# Patient Record
Sex: MALE | Race: WHITE | HISPANIC OR LATINO | URBAN - METROPOLITAN AREA
[De-identification: names, ages, dates, MRNs, and addresses within clinical notes are randomized per-mention and may not be internally consistent; named-entity substitution may affect disease eponyms.]

---

## 2017-08-08 ENCOUNTER — INPATIENT (INPATIENT)
Facility: HOSPITAL | Age: 42
LOS: 1 days | Discharge: HOME | DRG: 395 | End: 2017-08-10
Attending: SURGERY | Admitting: SURGERY
Payer: COMMERCIAL

## 2017-08-08 DIAGNOSIS — K35.89 OTHER ACUTE APPENDICITIS: ICD-10-CM

## 2017-08-08 DIAGNOSIS — K35.80 UNSPECIFIED ACUTE APPENDICITIS: ICD-10-CM

## 2017-08-08 DIAGNOSIS — Z87.891 PERSONAL HISTORY OF NICOTINE DEPENDENCE: ICD-10-CM

## 2017-08-08 PROCEDURE — 74177 CT ABD & PELVIS W/CONTRAST: CPT | Mod: 26

## 2017-08-08 PROCEDURE — 93010 ELECTROCARDIOGRAM REPORT: CPT

## 2017-08-08 PROCEDURE — 71010: CPT | Mod: 26

## 2017-08-08 PROCEDURE — 99223 1ST HOSP IP/OBS HIGH 75: CPT | Mod: AI

## 2017-08-09 PROCEDURE — 99233 SBSQ HOSP IP/OBS HIGH 50: CPT

## 2017-08-10 PROCEDURE — 74177 CT ABD & PELVIS W/CONTRAST: CPT

## 2017-08-10 PROCEDURE — 96375 TX/PRO/DX INJ NEW DRUG ADDON: CPT

## 2017-08-10 PROCEDURE — 80076 HEPATIC FUNCTION PANEL: CPT

## 2017-08-10 PROCEDURE — 99285 EMERGENCY DEPT VISIT HI MDM: CPT | Mod: 25

## 2017-08-10 PROCEDURE — 93005 ELECTROCARDIOGRAM TRACING: CPT

## 2017-08-10 PROCEDURE — 80048 BASIC METABOLIC PNL TOTAL CA: CPT

## 2017-08-10 PROCEDURE — 71045 X-RAY EXAM CHEST 1 VIEW: CPT

## 2017-08-10 PROCEDURE — 81003 URINALYSIS AUTO W/O SCOPE: CPT

## 2017-08-10 PROCEDURE — 80069 RENAL FUNCTION PANEL: CPT

## 2017-08-10 PROCEDURE — 86900 BLOOD TYPING SEROLOGIC ABO: CPT

## 2017-08-10 PROCEDURE — 96361 HYDRATE IV INFUSION ADD-ON: CPT

## 2017-08-10 PROCEDURE — 85027 COMPLETE CBC AUTOMATED: CPT

## 2017-08-10 PROCEDURE — 99233 SBSQ HOSP IP/OBS HIGH 50: CPT

## 2017-08-10 PROCEDURE — 96374 THER/PROPH/DIAG INJ IV PUSH: CPT | Mod: XU

## 2017-08-10 PROCEDURE — 85730 THROMBOPLASTIN TIME PARTIAL: CPT

## 2017-08-10 PROCEDURE — 85610 PROTHROMBIN TIME: CPT

## 2017-08-10 PROCEDURE — 82150 ASSAY OF AMYLASE: CPT

## 2017-08-10 PROCEDURE — 83690 ASSAY OF LIPASE: CPT

## 2017-08-16 ENCOUNTER — APPOINTMENT (OUTPATIENT)
Dept: SURGERY | Facility: CLINIC | Age: 42
End: 2017-08-16
Payer: COMMERCIAL

## 2017-08-16 VITALS — WEIGHT: 220 LBS | BODY MASS INDEX: 34.53 KG/M2 | HEIGHT: 67 IN

## 2017-08-16 DIAGNOSIS — K35.3 ACUTE APPENDICITIS WITH LOCALIZED PERITONITIS: ICD-10-CM

## 2017-08-16 PROCEDURE — 99214 OFFICE O/P EST MOD 30 MIN: CPT

## 2017-08-17 PROBLEM — K35.3 ACUTE APPENDICITIS WITH LOCALIZED PERITONITIS: Status: ACTIVE | Noted: 2017-08-17

## 2017-09-13 ENCOUNTER — APPOINTMENT (OUTPATIENT)
Dept: SURGERY | Facility: CLINIC | Age: 42
End: 2017-09-13

## 2018-01-25 ENCOUNTER — INPATIENT (INPATIENT)
Facility: HOSPITAL | Age: 43
LOS: 0 days | Discharge: HOME | DRG: 342 | End: 2018-01-26
Attending: SURGERY | Admitting: SURGERY
Payer: COMMERCIAL

## 2018-01-25 ENCOUNTER — TRANSCRIPTION ENCOUNTER (OUTPATIENT)
Age: 43
End: 2018-01-25

## 2018-01-25 ENCOUNTER — RESULT REVIEW (OUTPATIENT)
Age: 43
End: 2018-01-25

## 2018-01-25 DIAGNOSIS — F17.210 NICOTINE DEPENDENCE, CIGARETTES, UNCOMPLICATED: ICD-10-CM

## 2018-01-25 DIAGNOSIS — K42.0 UMBILICAL HERNIA WITH OBSTRUCTION, WITHOUT GANGRENE: ICD-10-CM

## 2018-01-25 DIAGNOSIS — K36 OTHER APPENDICITIS: ICD-10-CM

## 2018-01-25 DIAGNOSIS — R10.9 UNSPECIFIED ABDOMINAL PAIN: ICD-10-CM

## 2018-01-25 PROCEDURE — 93010 ELECTROCARDIOGRAM REPORT: CPT

## 2018-01-25 PROCEDURE — 88304 TISSUE EXAM BY PATHOLOGIST: CPT | Mod: 26

## 2018-01-25 PROCEDURE — 74177 CT ABD & PELVIS W/CONTRAST: CPT | Mod: 26

## 2018-01-25 PROCEDURE — 99222 1ST HOSP IP/OBS MODERATE 55: CPT | Mod: 57

## 2018-01-26 PROCEDURE — 85027 COMPLETE CBC AUTOMATED: CPT

## 2018-01-26 PROCEDURE — 99285 EMERGENCY DEPT VISIT HI MDM: CPT | Mod: 25

## 2018-01-26 PROCEDURE — 86900 BLOOD TYPING SEROLOGIC ABO: CPT

## 2018-01-26 PROCEDURE — 86850 RBC ANTIBODY SCREEN: CPT

## 2018-01-26 PROCEDURE — 88304 TISSUE EXAM BY PATHOLOGIST: CPT

## 2018-01-26 PROCEDURE — 44970 LAPAROSCOPY APPENDECTOMY: CPT

## 2018-01-26 PROCEDURE — 80048 BASIC METABOLIC PNL TOTAL CA: CPT

## 2018-01-26 PROCEDURE — 85610 PROTHROMBIN TIME: CPT

## 2018-01-26 PROCEDURE — 93005 ELECTROCARDIOGRAM TRACING: CPT

## 2018-01-26 PROCEDURE — 36415 COLL VENOUS BLD VENIPUNCTURE: CPT

## 2018-01-26 PROCEDURE — 85730 THROMBOPLASTIN TIME PARTIAL: CPT

## 2018-01-26 PROCEDURE — 74177 CT ABD & PELVIS W/CONTRAST: CPT

## 2018-01-26 PROCEDURE — 81003 URINALYSIS AUTO W/O SCOPE: CPT

## 2018-01-26 PROCEDURE — 80076 HEPATIC FUNCTION PANEL: CPT

## 2019-04-18 ENCOUNTER — EMERGENCY (EMERGENCY)
Facility: HOSPITAL | Age: 44
LOS: 1 days | Discharge: HOME | End: 2019-04-18
Attending: EMERGENCY MEDICINE | Admitting: EMERGENCY MEDICINE
Payer: COMMERCIAL

## 2019-04-18 VITALS
HEART RATE: 134 BPM | TEMPERATURE: 100 F | HEIGHT: 67 IN | OXYGEN SATURATION: 95 % | RESPIRATION RATE: 19 BRPM | SYSTOLIC BLOOD PRESSURE: 136 MMHG | DIASTOLIC BLOOD PRESSURE: 87 MMHG | WEIGHT: 220.02 LBS

## 2019-04-18 VITALS
DIASTOLIC BLOOD PRESSURE: 64 MMHG | OXYGEN SATURATION: 96 % | SYSTOLIC BLOOD PRESSURE: 106 MMHG | RESPIRATION RATE: 16 BRPM | TEMPERATURE: 99 F | HEART RATE: 96 BPM

## 2019-04-18 DIAGNOSIS — R19.7 DIARRHEA, UNSPECIFIED: ICD-10-CM

## 2019-04-18 LAB
ALBUMIN SERPL ELPH-MCNC: 4.2 G/DL — SIGNIFICANT CHANGE UP (ref 3.3–5)
ALP SERPL-CCNC: 93 U/L — SIGNIFICANT CHANGE UP (ref 40–120)
ALT FLD-CCNC: 135 U/L DA — HIGH (ref 10–45)
ANION GAP SERPL CALC-SCNC: 13 MMOL/L — SIGNIFICANT CHANGE UP (ref 5–17)
AST SERPL-CCNC: 86 U/L — HIGH (ref 10–40)
BASOPHILS # BLD AUTO: 0.1 K/UL — SIGNIFICANT CHANGE UP (ref 0–0.2)
BASOPHILS NFR BLD AUTO: 0.6 % — SIGNIFICANT CHANGE UP (ref 0–2)
BILIRUB SERPL-MCNC: 0.5 MG/DL — SIGNIFICANT CHANGE UP (ref 0.2–1.2)
BUN SERPL-MCNC: 15 MG/DL — SIGNIFICANT CHANGE UP (ref 7–23)
CALCIUM SERPL-MCNC: 9 MG/DL — SIGNIFICANT CHANGE UP (ref 8.4–10.5)
CHLORIDE SERPL-SCNC: 98 MMOL/L — SIGNIFICANT CHANGE UP (ref 96–108)
CO2 SERPL-SCNC: 23 MMOL/L — SIGNIFICANT CHANGE UP (ref 22–31)
CREAT SERPL-MCNC: 1.09 MG/DL — SIGNIFICANT CHANGE UP (ref 0.5–1.3)
EOSINOPHIL # BLD AUTO: 0 K/UL — SIGNIFICANT CHANGE UP (ref 0–0.5)
EOSINOPHIL NFR BLD AUTO: 0.2 % — SIGNIFICANT CHANGE UP (ref 0–6)
GLUCOSE SERPL-MCNC: 150 MG/DL — HIGH (ref 70–99)
HCT VFR BLD CALC: 51.2 % — HIGH (ref 39–50)
HGB BLD-MCNC: 17.5 G/DL — HIGH (ref 13–17)
LYMPHOCYTES # BLD AUTO: 1.6 K/UL — SIGNIFICANT CHANGE UP (ref 1–3.3)
LYMPHOCYTES # BLD AUTO: 13.8 % — SIGNIFICANT CHANGE UP (ref 13–44)
MCHC RBC-ENTMCNC: 32.8 PG — SIGNIFICANT CHANGE UP (ref 27–34)
MCHC RBC-ENTMCNC: 34.1 GM/DL — SIGNIFICANT CHANGE UP (ref 32–36)
MCV RBC AUTO: 96.1 FL — SIGNIFICANT CHANGE UP (ref 80–100)
MONOCYTES # BLD AUTO: 1 K/UL — HIGH (ref 0–0.9)
MONOCYTES NFR BLD AUTO: 8.5 % — SIGNIFICANT CHANGE UP (ref 1–9)
NEUTROPHILS # BLD AUTO: 8.9 K/UL — HIGH (ref 1.8–7.4)
NEUTROPHILS NFR BLD AUTO: 77 % — SIGNIFICANT CHANGE UP (ref 43–77)
PLATELET # BLD AUTO: 214 K/UL — SIGNIFICANT CHANGE UP (ref 150–400)
POTASSIUM SERPL-MCNC: 4.2 MMOL/L — SIGNIFICANT CHANGE UP (ref 3.5–5.3)
POTASSIUM SERPL-SCNC: 4.2 MMOL/L — SIGNIFICANT CHANGE UP (ref 3.5–5.3)
PROT SERPL-MCNC: 8.5 G/DL — HIGH (ref 6–8.3)
RBC # BLD: 5.33 M/UL — SIGNIFICANT CHANGE UP (ref 4.2–5.8)
RBC # FLD: 11.8 % — SIGNIFICANT CHANGE UP (ref 10.3–14.5)
SODIUM SERPL-SCNC: 134 MMOL/L — LOW (ref 135–145)
WBC # BLD: 11.6 K/UL — HIGH (ref 3.8–10.5)
WBC # FLD AUTO: 11.6 K/UL — HIGH (ref 3.8–10.5)

## 2019-04-18 PROCEDURE — 96375 TX/PRO/DX INJ NEW DRUG ADDON: CPT

## 2019-04-18 PROCEDURE — 99284 EMERGENCY DEPT VISIT MOD MDM: CPT | Mod: 25

## 2019-04-18 PROCEDURE — 96361 HYDRATE IV INFUSION ADD-ON: CPT

## 2019-04-18 PROCEDURE — 85027 COMPLETE CBC AUTOMATED: CPT

## 2019-04-18 PROCEDURE — 36415 COLL VENOUS BLD VENIPUNCTURE: CPT

## 2019-04-18 PROCEDURE — 96374 THER/PROPH/DIAG INJ IV PUSH: CPT

## 2019-04-18 PROCEDURE — 80053 COMPREHEN METABOLIC PANEL: CPT

## 2019-04-18 RX ORDER — SODIUM CHLORIDE 9 MG/ML
1000 INJECTION INTRAMUSCULAR; INTRAVENOUS; SUBCUTANEOUS ONCE
Qty: 0 | Refills: 0 | Status: COMPLETED | OUTPATIENT
Start: 2019-04-18 | End: 2019-04-18

## 2019-04-18 RX ORDER — KETOROLAC TROMETHAMINE 30 MG/ML
30 SYRINGE (ML) INJECTION ONCE
Qty: 0 | Refills: 0 | Status: DISCONTINUED | OUTPATIENT
Start: 2019-04-18 | End: 2019-04-18

## 2019-04-18 RX ORDER — ONDANSETRON 8 MG/1
4 TABLET, FILM COATED ORAL ONCE
Qty: 0 | Refills: 0 | Status: COMPLETED | OUTPATIENT
Start: 2019-04-18 | End: 2019-04-18

## 2019-04-18 RX ADMIN — SODIUM CHLORIDE 1000 MILLILITER(S): 9 INJECTION INTRAMUSCULAR; INTRAVENOUS; SUBCUTANEOUS at 01:20

## 2019-04-18 RX ADMIN — Medication 30 MILLIGRAM(S): at 00:46

## 2019-04-18 RX ADMIN — ONDANSETRON 4 MILLIGRAM(S): 8 TABLET, FILM COATED ORAL at 00:46

## 2019-04-18 RX ADMIN — Medication 30 MILLIGRAM(S): at 01:10

## 2019-04-18 RX ADMIN — SODIUM CHLORIDE 2000 MILLILITER(S): 9 INJECTION INTRAMUSCULAR; INTRAVENOUS; SUBCUTANEOUS at 00:46

## 2019-04-18 NOTE — ED ADULT NURSE NOTE - CHIEF COMPLAINT QUOTE
pt c/o diarrhea, headache and fevers since yesterday.  Pt traveled to [facility] 6 weeks ago.

## 2019-04-18 NOTE — ED ADULT NURSE NOTE - OBJECTIVE STATEMENT
Pt presents to ED w/ c/o diarrhea since yesterday 4/17/18 & headache. Pt states "I had diarrhea probably over 100x yesterday". Pt c/o feeling nauseous however denies vomiting & abdominal pain. Pt recently traveled to [facility] 6 weeks ago.

## 2019-04-18 NOTE — ED ADULT TRIAGE NOTE - CHIEF COMPLAINT QUOTE
pt c/o diarrhea, headache and fevers since yesterday.  Pt traveled to [facility] 6 weeks ago.

## 2019-04-18 NOTE — ED PROVIDER NOTE - OBJECTIVE STATEMENT
Pertinent PMH/PSH/FHx/SHx and Review of Systems contained within:  43 y/o male no sig PMhx presents to ed c/o multiple episodes of watery diarrhea since yesterday, no change in food, wife has samle symptoms

## 2019-04-18 NOTE — ED PROVIDER NOTE - NS HIV RISK FACTOR YES
Detail Level: Zone
Note Text (......Xxx Chief Complaint.): This diagnosis correlates with the
Other (Free Text): LN2 x 1 lesion, no charge
Declined

## 2020-04-28 ENCOUNTER — TRANSCRIPTION ENCOUNTER (OUTPATIENT)
Age: 45
End: 2020-04-28

## 2020-06-10 ENCOUNTER — TRANSCRIPTION ENCOUNTER (OUTPATIENT)
Age: 45
End: 2020-06-10

## 2022-05-29 ENCOUNTER — INPATIENT (INPATIENT)
Facility: HOSPITAL | Age: 47
LOS: 1 days | Discharge: HOME | DRG: 637 | End: 2022-05-31
Attending: HOSPITALIST | Admitting: HOSPITALIST
Payer: COMMERCIAL

## 2022-05-29 VITALS
HEIGHT: 67 IN | WEIGHT: 165.35 LBS | HEART RATE: 112 BPM | TEMPERATURE: 98 F | SYSTOLIC BLOOD PRESSURE: 126 MMHG | RESPIRATION RATE: 21 BRPM | DIASTOLIC BLOOD PRESSURE: 82 MMHG

## 2022-05-29 DIAGNOSIS — E11.10 TYPE 2 DIABETES MELLITUS WITH KETOACIDOSIS WITHOUT COMA: ICD-10-CM

## 2022-05-29 DIAGNOSIS — Z90.49 ACQUIRED ABSENCE OF OTHER SPECIFIED PARTS OF DIGESTIVE TRACT: Chronic | ICD-10-CM

## 2022-05-29 DIAGNOSIS — Z98.890 OTHER SPECIFIED POSTPROCEDURAL STATES: Chronic | ICD-10-CM

## 2022-05-29 PROBLEM — Z78.9 OTHER SPECIFIED HEALTH STATUS: Chronic | Status: ACTIVE | Noted: 2019-04-18

## 2022-05-29 LAB
A1C WITH ESTIMATED AVERAGE GLUCOSE RESULT: >15.5 % — HIGH (ref 4–5.6)
ALBUMIN SERPL ELPH-MCNC: 3.1 G/DL — LOW (ref 3.3–5)
ALP SERPL-CCNC: 137 U/L — HIGH (ref 40–120)
ALT FLD-CCNC: 22 U/L — SIGNIFICANT CHANGE UP (ref 10–45)
ANION GAP SERPL CALC-SCNC: 15 MMOL/L — SIGNIFICANT CHANGE UP (ref 5–17)
ANION GAP SERPL CALC-SCNC: 18 MMOL/L — HIGH (ref 5–17)
APPEARANCE UR: CLEAR — SIGNIFICANT CHANGE UP
AST SERPL-CCNC: 9 U/L — LOW (ref 10–40)
BACTERIA # UR AUTO: ABNORMAL /HPF
BASE EXCESS BLDV CALC-SCNC: -15.4 MMOL/L — LOW (ref -2–3)
BASOPHILS # BLD AUTO: 0.03 K/UL — SIGNIFICANT CHANGE UP (ref 0–0.2)
BASOPHILS NFR BLD AUTO: 0.3 % — SIGNIFICANT CHANGE UP (ref 0–2)
BILIRUB SERPL-MCNC: 0.4 MG/DL — SIGNIFICANT CHANGE UP (ref 0.2–1.2)
BILIRUB UR-MCNC: NEGATIVE — SIGNIFICANT CHANGE UP
BLOOD GAS COMMENTS, VENOUS: SIGNIFICANT CHANGE UP
BUN SERPL-MCNC: 14 MG/DL — SIGNIFICANT CHANGE UP (ref 7–23)
BUN SERPL-MCNC: 15 MG/DL — SIGNIFICANT CHANGE UP (ref 7–23)
CALCIUM SERPL-MCNC: 7.7 MG/DL — LOW (ref 8.4–10.5)
CALCIUM SERPL-MCNC: 8.5 MG/DL — SIGNIFICANT CHANGE UP (ref 8.4–10.5)
CHLORIDE SERPL-SCNC: 105 MMOL/L — SIGNIFICANT CHANGE UP (ref 96–108)
CHLORIDE SERPL-SCNC: 97 MMOL/L — SIGNIFICANT CHANGE UP (ref 96–108)
CO2 BLDV-SCNC: 13 MMOL/L — LOW (ref 22–26)
CO2 SERPL-SCNC: 18 MMOL/L — LOW (ref 22–31)
CO2 SERPL-SCNC: 19 MMOL/L — LOW (ref 22–31)
COLOR SPEC: YELLOW — SIGNIFICANT CHANGE UP
COMMENT - URINE: SIGNIFICANT CHANGE UP
CREAT SERPL-MCNC: 0.83 MG/DL — SIGNIFICANT CHANGE UP (ref 0.5–1.3)
CREAT SERPL-MCNC: 0.84 MG/DL — SIGNIFICANT CHANGE UP (ref 0.5–1.3)
DIFF PNL FLD: NEGATIVE — SIGNIFICANT CHANGE UP
EGFR: 109 ML/MIN/1.73M2 — SIGNIFICANT CHANGE UP
EGFR: 109 ML/MIN/1.73M2 — SIGNIFICANT CHANGE UP
EOSINOPHIL # BLD AUTO: 0.22 K/UL — SIGNIFICANT CHANGE UP (ref 0–0.5)
EOSINOPHIL NFR BLD AUTO: 2.4 % — SIGNIFICANT CHANGE UP (ref 0–6)
EPI CELLS # UR: SIGNIFICANT CHANGE UP
ESTIMATED AVERAGE GLUCOSE: >398 MG/DL — HIGH (ref 68–114)
GAS PNL BLDV: SIGNIFICANT CHANGE UP
GLUCOSE BLDC GLUCOMTR-MCNC: 217 MG/DL — HIGH (ref 70–99)
GLUCOSE BLDC GLUCOMTR-MCNC: 248 MG/DL — HIGH (ref 70–99)
GLUCOSE BLDC GLUCOMTR-MCNC: 260 MG/DL — HIGH (ref 70–99)
GLUCOSE BLDC GLUCOMTR-MCNC: 277 MG/DL — HIGH (ref 70–99)
GLUCOSE BLDC GLUCOMTR-MCNC: 287 MG/DL — HIGH (ref 70–99)
GLUCOSE BLDC GLUCOMTR-MCNC: 407 MG/DL — HIGH (ref 70–99)
GLUCOSE SERPL-MCNC: 271 MG/DL — HIGH (ref 70–99)
GLUCOSE SERPL-MCNC: 423 MG/DL — HIGH (ref 70–99)
GLUCOSE UR QL: 1000 MG/DL
HCO3 BLDV-SCNC: 12 MMOL/L — LOW (ref 22–29)
HCT VFR BLD CALC: 44.1 % — SIGNIFICANT CHANGE UP (ref 39–50)
HGB BLD-MCNC: 15.3 G/DL — SIGNIFICANT CHANGE UP (ref 13–17)
IMM GRANULOCYTES NFR BLD AUTO: 0.4 % — SIGNIFICANT CHANGE UP (ref 0–1.5)
KETONES UR-MCNC: ABNORMAL
LEUKOCYTE ESTERASE UR-ACNC: NEGATIVE — SIGNIFICANT CHANGE UP
LYMPHOCYTES # BLD AUTO: 2.02 K/UL — SIGNIFICANT CHANGE UP (ref 1–3.3)
LYMPHOCYTES # BLD AUTO: 21.6 % — SIGNIFICANT CHANGE UP (ref 13–44)
MAGNESIUM SERPL-MCNC: 1.9 MG/DL — SIGNIFICANT CHANGE UP (ref 1.6–2.6)
MCHC RBC-ENTMCNC: 32.7 PG — SIGNIFICANT CHANGE UP (ref 27–34)
MCHC RBC-ENTMCNC: 34.7 GM/DL — SIGNIFICANT CHANGE UP (ref 32–36)
MCV RBC AUTO: 94.2 FL — SIGNIFICANT CHANGE UP (ref 80–100)
MONOCYTES # BLD AUTO: 0.92 K/UL — HIGH (ref 0–0.9)
MONOCYTES NFR BLD AUTO: 9.8 % — SIGNIFICANT CHANGE UP (ref 2–14)
NEUTROPHILS # BLD AUTO: 6.13 K/UL — SIGNIFICANT CHANGE UP (ref 1.8–7.4)
NEUTROPHILS NFR BLD AUTO: 65.5 % — SIGNIFICANT CHANGE UP (ref 43–77)
NITRITE UR-MCNC: NEGATIVE — SIGNIFICANT CHANGE UP
NRBC # BLD: 0 /100 WBCS — SIGNIFICANT CHANGE UP (ref 0–0)
PCO2 BLDV: 28 MMHG — LOW (ref 42–55)
PH BLDV: 7.24 — LOW (ref 7.32–7.43)
PH UR: 6 — SIGNIFICANT CHANGE UP (ref 5–8)
PHOSPHATE SERPL-MCNC: 3.8 MG/DL — SIGNIFICANT CHANGE UP (ref 2.5–4.5)
PLATELET # BLD AUTO: 285 K/UL — SIGNIFICANT CHANGE UP (ref 150–400)
PO2 BLDV: 68 MMHG — HIGH (ref 25–45)
POTASSIUM SERPL-MCNC: 4.1 MMOL/L — SIGNIFICANT CHANGE UP (ref 3.5–5.3)
POTASSIUM SERPL-MCNC: 4.5 MMOL/L — SIGNIFICANT CHANGE UP (ref 3.5–5.3)
POTASSIUM SERPL-SCNC: 4.1 MMOL/L — SIGNIFICANT CHANGE UP (ref 3.5–5.3)
POTASSIUM SERPL-SCNC: 4.5 MMOL/L — SIGNIFICANT CHANGE UP (ref 3.5–5.3)
PROT SERPL-MCNC: 7.2 G/DL — SIGNIFICANT CHANGE UP (ref 6–8.3)
PROT UR-MCNC: 30 MG/DL
RBC # BLD: 4.68 M/UL — SIGNIFICANT CHANGE UP (ref 4.2–5.8)
RBC # FLD: 11.6 % — SIGNIFICANT CHANGE UP (ref 10.3–14.5)
RBC CASTS # UR COMP ASSIST: NEGATIVE /HPF — SIGNIFICANT CHANGE UP (ref 0–4)
SAO2 % BLDV: 94 % — HIGH (ref 67–88)
SARS-COV-2 RNA SPEC QL NAA+PROBE: DETECTED
SODIUM SERPL-SCNC: 133 MMOL/L — LOW (ref 135–145)
SODIUM SERPL-SCNC: 139 MMOL/L — SIGNIFICANT CHANGE UP (ref 135–145)
SP GR SPEC: 1.02 — SIGNIFICANT CHANGE UP (ref 1.01–1.02)
UROBILINOGEN FLD QL: NEGATIVE — SIGNIFICANT CHANGE UP
WBC # BLD: 9.36 K/UL — SIGNIFICANT CHANGE UP (ref 3.8–10.5)
WBC # FLD AUTO: 9.36 K/UL — SIGNIFICANT CHANGE UP (ref 3.8–10.5)
WBC UR QL: NEGATIVE /HPF — SIGNIFICANT CHANGE UP (ref 0–5)

## 2022-05-29 PROCEDURE — 99223 1ST HOSP IP/OBS HIGH 75: CPT

## 2022-05-29 PROCEDURE — 93010 ELECTROCARDIOGRAM REPORT: CPT

## 2022-05-29 PROCEDURE — 99285 EMERGENCY DEPT VISIT HI MDM: CPT

## 2022-05-29 PROCEDURE — 71045 X-RAY EXAM CHEST 1 VIEW: CPT | Mod: 26

## 2022-05-29 RX ORDER — SODIUM CHLORIDE 9 MG/ML
1000 INJECTION, SOLUTION INTRAVENOUS
Refills: 0 | Status: DISCONTINUED | OUTPATIENT
Start: 2022-05-29 | End: 2022-05-31

## 2022-05-29 RX ORDER — DEXTROSE 50 % IN WATER 50 %
25 SYRINGE (ML) INTRAVENOUS ONCE
Refills: 0 | Status: DISCONTINUED | OUTPATIENT
Start: 2022-05-29 | End: 2022-05-31

## 2022-05-29 RX ORDER — GLUCAGON INJECTION, SOLUTION 0.5 MG/.1ML
1 INJECTION, SOLUTION SUBCUTANEOUS ONCE
Refills: 0 | Status: DISCONTINUED | OUTPATIENT
Start: 2022-05-29 | End: 2022-05-31

## 2022-05-29 RX ORDER — INSULIN GLARGINE 100 [IU]/ML
18 INJECTION, SOLUTION SUBCUTANEOUS EVERY MORNING
Refills: 0 | Status: DISCONTINUED | OUTPATIENT
Start: 2022-05-29 | End: 2022-05-31

## 2022-05-29 RX ORDER — SODIUM CHLORIDE 9 MG/ML
1000 INJECTION INTRAMUSCULAR; INTRAVENOUS; SUBCUTANEOUS ONCE
Refills: 0 | Status: COMPLETED | OUTPATIENT
Start: 2022-05-29 | End: 2022-05-29

## 2022-05-29 RX ORDER — INSULIN GLARGINE 100 [IU]/ML
10 INJECTION, SOLUTION SUBCUTANEOUS ONCE
Refills: 0 | Status: COMPLETED | OUTPATIENT
Start: 2022-05-29 | End: 2022-05-29

## 2022-05-29 RX ORDER — INSULIN LISPRO 100/ML
5 VIAL (ML) SUBCUTANEOUS ONCE
Refills: 0 | Status: COMPLETED | OUTPATIENT
Start: 2022-05-29 | End: 2022-05-29

## 2022-05-29 RX ORDER — SODIUM CHLORIDE 9 MG/ML
1000 INJECTION INTRAMUSCULAR; INTRAVENOUS; SUBCUTANEOUS
Refills: 0 | Status: DISCONTINUED | OUTPATIENT
Start: 2022-05-29 | End: 2022-05-29

## 2022-05-29 RX ORDER — DEXTROSE 50 % IN WATER 50 %
15 SYRINGE (ML) INTRAVENOUS ONCE
Refills: 0 | Status: DISCONTINUED | OUTPATIENT
Start: 2022-05-29 | End: 2022-05-29

## 2022-05-29 RX ORDER — INSULIN LISPRO 100/ML
VIAL (ML) SUBCUTANEOUS
Refills: 0 | Status: DISCONTINUED | OUTPATIENT
Start: 2022-05-29 | End: 2022-05-31

## 2022-05-29 RX ORDER — ONDANSETRON 8 MG/1
4 TABLET, FILM COATED ORAL EVERY 8 HOURS
Refills: 0 | Status: DISCONTINUED | OUTPATIENT
Start: 2022-05-29 | End: 2022-05-31

## 2022-05-29 RX ORDER — ACETAMINOPHEN 500 MG
650 TABLET ORAL EVERY 6 HOURS
Refills: 0 | Status: DISCONTINUED | OUTPATIENT
Start: 2022-05-29 | End: 2022-05-31

## 2022-05-29 RX ORDER — DEXTROSE 50 % IN WATER 50 %
15 SYRINGE (ML) INTRAVENOUS ONCE
Refills: 0 | Status: DISCONTINUED | OUTPATIENT
Start: 2022-05-29 | End: 2022-05-31

## 2022-05-29 RX ORDER — INFLUENZA VIRUS VACCINE 15; 15; 15; 15 UG/.5ML; UG/.5ML; UG/.5ML; UG/.5ML
0.5 SUSPENSION INTRAMUSCULAR ONCE
Refills: 0 | Status: COMPLETED | OUTPATIENT
Start: 2022-05-29 | End: 2022-05-29

## 2022-05-29 RX ORDER — INSULIN LISPRO 100/ML
VIAL (ML) SUBCUTANEOUS AT BEDTIME
Refills: 0 | Status: DISCONTINUED | OUTPATIENT
Start: 2022-05-29 | End: 2022-05-31

## 2022-05-29 RX ORDER — DEXTROSE 50 % IN WATER 50 %
12.5 SYRINGE (ML) INTRAVENOUS ONCE
Refills: 0 | Status: DISCONTINUED | OUTPATIENT
Start: 2022-05-29 | End: 2022-05-31

## 2022-05-29 RX ORDER — INSULIN LISPRO 100/ML
6 VIAL (ML) SUBCUTANEOUS
Refills: 0 | Status: DISCONTINUED | OUTPATIENT
Start: 2022-05-29 | End: 2022-05-31

## 2022-05-29 RX ORDER — INSULIN LISPRO 100/ML
4 VIAL (ML) SUBCUTANEOUS
Refills: 0 | Status: DISCONTINUED | OUTPATIENT
Start: 2022-05-29 | End: 2022-05-29

## 2022-05-29 RX ORDER — INSULIN LISPRO 100/ML
5 VIAL (ML) SUBCUTANEOUS
Refills: 0 | Status: DISCONTINUED | OUTPATIENT
Start: 2022-05-29 | End: 2022-05-29

## 2022-05-29 RX ORDER — INSULIN GLARGINE 100 [IU]/ML
10 INJECTION, SOLUTION SUBCUTANEOUS EVERY MORNING
Refills: 0 | Status: DISCONTINUED | OUTPATIENT
Start: 2022-05-30 | End: 2022-05-29

## 2022-05-29 RX ORDER — INSULIN LISPRO 100/ML
7 VIAL (ML) SUBCUTANEOUS
Refills: 0 | Status: DISCONTINUED | OUTPATIENT
Start: 2022-05-29 | End: 2022-05-29

## 2022-05-29 RX ORDER — SODIUM CHLORIDE 9 MG/ML
1000 INJECTION INTRAMUSCULAR; INTRAVENOUS; SUBCUTANEOUS
Refills: 0 | Status: DISCONTINUED | OUTPATIENT
Start: 2022-05-29 | End: 2022-05-31

## 2022-05-29 RX ORDER — ENOXAPARIN SODIUM 100 MG/ML
40 INJECTION SUBCUTANEOUS EVERY 24 HOURS
Refills: 0 | Status: DISCONTINUED | OUTPATIENT
Start: 2022-05-29 | End: 2022-05-29

## 2022-05-29 RX ORDER — LANOLIN ALCOHOL/MO/W.PET/CERES
3 CREAM (GRAM) TOPICAL AT BEDTIME
Refills: 0 | Status: DISCONTINUED | OUTPATIENT
Start: 2022-05-29 | End: 2022-05-31

## 2022-05-29 RX ADMIN — Medication 3: at 12:31

## 2022-05-29 RX ADMIN — Medication 2: at 16:51

## 2022-05-29 RX ADMIN — SODIUM CHLORIDE 1000 MILLILITER(S): 9 INJECTION INTRAMUSCULAR; INTRAVENOUS; SUBCUTANEOUS at 08:00

## 2022-05-29 RX ADMIN — Medication 5 UNIT(S): at 12:30

## 2022-05-29 RX ADMIN — Medication 5 UNIT(S): at 16:52

## 2022-05-29 RX ADMIN — SODIUM CHLORIDE 1000 MILLILITER(S): 9 INJECTION INTRAMUSCULAR; INTRAVENOUS; SUBCUTANEOUS at 06:31

## 2022-05-29 RX ADMIN — SODIUM CHLORIDE 75 MILLILITER(S): 9 INJECTION INTRAMUSCULAR; INTRAVENOUS; SUBCUTANEOUS at 10:26

## 2022-05-29 RX ADMIN — Medication 1: at 23:18

## 2022-05-29 RX ADMIN — INSULIN GLARGINE 10 UNIT(S): 100 INJECTION, SOLUTION SUBCUTANEOUS at 10:26

## 2022-05-29 NOTE — ED PROVIDER NOTE - NS ED ROS FT
GENERAL: No fever, chills  EYES: + itchy eyes, rhinitis   ENT: no difficulty swallowing or speaking   CARDIAC: no chest pain/pressure, SOB, lower extremity swelling  PULMONARY: no cough, SOB  GI: no abdominal pain, n/v/d  : no dysuria, no hematuria  SKIN: no rashes, no ecchymosis  NEURO: no headache, lightheadedness  MSK: No joint pain, myalgia, + "weakness of b/l upper and lower extremities".

## 2022-05-29 NOTE — ED ADULT NURSE NOTE - OBJECTIVE STATEMENT
Pt presented to ed c/o weakness, as per pt from last 3 days he is having weakness of his extremities, denies any n/v, headache, blurry vision, loss of balance, double vision, numbness on extremities, any falls, head injury. on assessment pt is able to move all 4 extremities without any discomfort, sensation b/l is equal on exam, no facial droop noted, pt is able to speak in full sentence, no slurry speech noted. Pt is awake, alert and oriented x 3, able to follow commands, on room air maintaining saturation, respiration is regular and non labored, pt is tachy to 112 when received, denies any active chest pain, palpitation, pt remains comfortable in the ed, will monitor, safety maintained.

## 2022-05-29 NOTE — ED PROVIDER NOTE - OBJECTIVE STATEMENT
46 Yo M hx of DM on oral medications, pw vague complaints of weakness. Pt states he has "no power" in his b/l upper and lower extremities x 3 days, but states "Im fine". Pt noted to ambulate into ED without assistance 5/5 strength in all extremities. Of note, pt's friend recently . Pt sates he "wasn't feeling well and he told him to get checked out, but his friend didn't and ended up dying."

## 2022-05-29 NOTE — ED PROVIDER NOTE - PHYSICAL EXAMINATION
GEN: Patient awake alert NAD.   HEENT: normocephalic, atraumatic, EOMI, no scleral icterus, no scleral injection, moist MM  CARDIAC: Sinus tachycardia, S1, S2, no murmur.   PULM: CTA B/L no wheeze, rhonchi, rales.   ABD: soft NT, ND, no rebound no guarding  MSK: Moving all extremities, no edema. 5/5 strength and full ROM in all extremities.     NEURO: A&Ox3, gait normal, no focal neurological deficits, CN 2-12 grossly intact  SKIN: warm, dry, no rash.

## 2022-05-29 NOTE — H&P ADULT - ASSESSMENT
48 yo M with pmhx of DM2 and HLD presents for 3 days of generalized weakness and fatigue found with hyperglycemia, FS 400s, and positive for COVID.     # High anionic gap metabolic acidosis due to possible mild DKA  # DM2 with hyperglycemia  # Electrolyte abnormalities  - check Hba1c  - start lantus 15unit and admelog 5unit qAC, ISS and monitor FS  - monitor BMP    # COVID positive  - pt with mild symptoms. saturating well on RA  - no indications for COVID treatment at this time    # HLD   - cont lipitor     # DVT ppx: encourage ambulation. low risks 48 yo M with pmhx of DM2 and HLD presents for 3 days of generalized weakness and fatigue found with hyperglycemia, FS 400s, and positive for COVID.     # High anionic gap metabolic acidosis due to possible mild DKA  # DM2 with hyperglycemia  # Hyponatremia  - check Hba1c  - start lantus 15unit and admelog 5unit qAC, ISS and monitor FS  - monitor BMP    # COVID positive  - pt with mild symptoms. saturating well on RA  - no indications for COVID treatment at this time    # Mild transaminitis   - monitor     # HLD   - cont lipitor     # DVT ppx: encourage ambulation. low risks 46 yo M with pmhx of DM2 and HLD presents for 3 days of generalized weakness and fatigue found with hyperglycemia, FS 400s, and positive for COVID.     # High anionic gap metabolic acidosis due to likely mild DKA  # DM2 with hyperglycemia  # Hyponatremia  - check Hba1c  - start lantus 15unit and admelog 5unit qAC, ISS and monitor FS  - monitor BMP    # COVID positive  - pt with mild symptoms. saturating well on RA  - no indications for COVID treatment at this time    # Mild transaminitis   - monitor     # HLD   - cont lipitor   - check lipid panel    # DVT ppx: encourage ambulation. low risks

## 2022-05-29 NOTE — H&P ADULT - NSHPLABSRESULTS_GEN_ALL_CORE
LABS:                        15.3   9.36  )-----------( 285      ( 29 May 2022 06:30 )             44.1         139  |  105  |  14  ----------------------------<  271<H>  4.1   |  19<L>  |  0.84    Ca    7.7<L>      29 May 2022 08:12  Phos  3.8       Mg     1.9         TPro  7.2  /  Alb  3.1<L>  /  TBili  0.4  /  DBili  x   /  AST  9<L>  /  ALT  22  /  AlkPhos  137<H>        Urinalysis Basic - ( 29 May 2022 06:30 )    Color: Yellow / Appearance: Clear / S.025 / pH: x  Gluc: x / Ketone: Large  / Bili: Negative / Urobili: Negative   Blood: x / Protein: 30 mg/dL / Nitrite: Negative   Leuk Esterase: Negative / RBC: Negative /HPF / WBC Negative /HPF   Sq Epi: x / Non Sq Epi: Neg.-Few / Bacteria: Trace /HPF       CAPILLARY BLOOD GLUCOSE      POCT Blood Glucose.: 277 mg/dL (29 May 2022 07:49)  POCT Blood Glucose.: 407 mg/dL (29 May 2022 06:12)        Urinalysis Basic - ( 29 May 2022 06:30 )    Color: Yellow / Appearance: Clear / S.025 / pH: x  Gluc: x / Ketone: Large  / Bili: Negative / Urobili: Negative   Blood: x / Protein: 30 mg/dL / Nitrite: Negative   Leuk Esterase: Negative / RBC: Negative /HPF / WBC Negative /HPF   Sq Epi: x / Non Sq Epi: Neg.-Few / Bacteria: Trace /HPF        RADIOLOGY & ADDITIONAL TESTS:  CXR reviewed by me. clear lungs    Consultant(s) Notes Reviewed:  [x ] YES  [ ] NO  Care Discussed with Consultants/Other Providers [ x] YES  [ ] NO  Imaging Personally Reviewed:  [ ] YES  [ ] NO

## 2022-05-29 NOTE — ED PROVIDER NOTE - CLINICAL SUMMARY MEDICAL DECISION MAKING FREE TEXT BOX
46 Yo M hx of DM on oral medications, pw vague complaints of weakness. Pt states he has "no power" in his b/l upper and lower extremities x 3 days, but states "Im fine". Pt noted to ambulate into ED without assistance 5/5 strength in all extremities. No significant finding on exam. ddx: hyperglycemia, DKA, electrolyte disturbance, lower suspicion for infection. Labs, reassess for disposition.

## 2022-05-29 NOTE — PATIENT PROFILE ADULT - NSPRESCRALCSIXMORE_GEN_A_NUR
Information obtained from Family. Family is concerned with Father's Drinking Problem./Four or more times a week

## 2022-05-29 NOTE — PATIENT PROFILE ADULT - NSPRESCRALCFREQ_GEN_A_NUR
Never Information obtained from Family. Family is concerned with Father's Drinking Problem./2-3 times a week

## 2022-05-29 NOTE — ED PROVIDER NOTE - PROGRESS NOTE DETAILS
Carolina Omalley MD, Attending  Pt agreeable to admission. Spoke to Dr. Carolina HARRIS attending, pt does not need icu level of care at this time and recommend floor admission.

## 2022-05-29 NOTE — H&P ADULT - HISTORY OF PRESENT ILLNESS
46 yo M with pmhx of DM2 and HLD presents for 3 days of generalized weakness and fatigue. In ED, pt found with hyperglycemia,  and VBG showed pH 7.24 and bicarb 12. Pt also found positive for COVID. Pt admits to mild cough and nasal drainage for a few days, denies any fever, chills, SOB, CP, palpitations, abd pain, N/V/D. Pt also admits to recent weight loss, thirst, and frequent urination. Other ROS negative.  In ED, pt afebrile and VS stable.

## 2022-05-29 NOTE — H&P ADULT - NSICDXPASTMEDICALHX_GEN_ALL_CORE_FT
PAST MEDICAL HISTORY:  DM2 (diabetes mellitus, type 2)     HLD (hyperlipidemia)     No pertinent past medical history

## 2022-05-29 NOTE — H&P ADULT - NSHPPHYSICALEXAM_GEN_ALL_CORE
T(C): 37 (05-29-22 @ 09:28), Max: 37 (05-29-22 @ 09:28)  HR: 90 (05-29-22 @ 09:28) (90 - 112)  BP: 119/78 (05-29-22 @ 09:28) (107/70 - 126/82)  RR: 18 (05-29-22 @ 09:28) (18 - 21)  SpO2: 97% (05-29-22 @ 09:28) (97% - 97%)  Wt(kg): --Vital Signs Last 24 Hrs  T(C): 37 (29 May 2022 09:28), Max: 37 (29 May 2022 09:28)  T(F): 98.6 (29 May 2022 09:28), Max: 98.6 (29 May 2022 09:28)  HR: 90 (29 May 2022 09:28) (90 - 112)  BP: 119/78 (29 May 2022 09:28) (107/70 - 126/82)  BP(mean): 82 (29 May 2022 09:04) (82 - 82)  RR: 18 (29 May 2022 09:28) (18 - 21)  SpO2: 97% (29 May 2022 09:28) (97% - 97%)    PHYSICAL EXAM:  GENERAL: NAD, well-groomed, well-developed  HEAD:  Atraumatic, Normocephalic  EYES: EOMI, PERRLA, conjunctiva and sclera clear  ENMT: No tonsillar erythema, exudates, or enlargement; Moist mucous membranes, Good dentition, No lesions  NECK: Supple, No JVD, Normal thyroid  NERVOUS SYSTEM:  Alert & Oriented X3, Good concentration; Motor Strength 5/5 B/L upper and lower extremities  CHEST/LUNG: Clear to percussion bilaterally; No rales, rhonchi, wheezing, or rubs  HEART: Regular rate and rhythm; No murmurs, rubs, or gallops  ABDOMEN: Soft, Nontender, Nondistended; Bowel sounds present  EXTREMITIES:  No clubbing, cyanosis, or edema  SKIN: No rashes or lesions

## 2022-05-29 NOTE — ED ADULT NURSE REASSESSMENT NOTE - NS ED NURSE REASSESS COMMENT FT1
Pt FS of 277.  MD Samuel notified, hold insulin at this time. Repeat BMP sent to lab as per MD orders.

## 2022-05-29 NOTE — ED ADULT NURSE REASSESSMENT NOTE - NS ED NURSE REASSESS COMMENT FT1
Pt received from [facility] [place] at this time.  Pt is resting comfortably on stretcher.  Currently receiving IVF bolus x 2.  No acute distress noted.  Safety maintained.  Will continue to monitor.

## 2022-05-29 NOTE — PATIENT PROFILE ADULT - FALL HARM RISK - UNIVERSAL INTERVENTIONS
Bed in lowest position, wheels locked, appropriate side rails in place/Call bell, personal items and telephone in reach/Instruct patient to call for assistance before getting out of bed or chair/Non-slip footwear when patient is out of bed/[place] to call system/Physically safe environment - no spills, clutter or unnecessary equipment/Purposeful Proactive Rounding/Room/bathroom lighting operational, light cord in reach

## 2022-05-29 NOTE — PATIENT PROFILE ADULT - NSPROPTRIGHTREPNAME_GEN_A__NUR
Last INR: 12/09/20=2.2  Next INR: 12/29/20    Prescription approved per G Refill Protocol.    Shaylee Funez RN               Sandra Rodriguez (wife)

## 2022-05-30 LAB
ALBUMIN SERPL ELPH-MCNC: 2.6 G/DL — LOW (ref 3.3–5)
ALP SERPL-CCNC: 93 U/L — SIGNIFICANT CHANGE UP (ref 40–120)
ALT FLD-CCNC: 17 U/L — SIGNIFICANT CHANGE UP (ref 10–45)
ANION GAP SERPL CALC-SCNC: 12 MMOL/L — SIGNIFICANT CHANGE UP (ref 5–17)
AST SERPL-CCNC: 12 U/L — SIGNIFICANT CHANGE UP (ref 10–40)
BILIRUB DIRECT SERPL-MCNC: 0.1 MG/DL — SIGNIFICANT CHANGE UP (ref 0–0.3)
BILIRUB INDIRECT FLD-MCNC: 0.3 MG/DL — SIGNIFICANT CHANGE UP (ref 0.2–1)
BILIRUB SERPL-MCNC: 0.4 MG/DL — SIGNIFICANT CHANGE UP (ref 0.2–1.2)
BUN SERPL-MCNC: 13 MG/DL — SIGNIFICANT CHANGE UP (ref 7–23)
CALCIUM SERPL-MCNC: 8.4 MG/DL — SIGNIFICANT CHANGE UP (ref 8.4–10.5)
CHLORIDE SERPL-SCNC: 101 MMOL/L — SIGNIFICANT CHANGE UP (ref 96–108)
CHOLEST SERPL-MCNC: 228 MG/DL — HIGH
CO2 SERPL-SCNC: 27 MMOL/L — SIGNIFICANT CHANGE UP (ref 22–31)
CREAT SERPL-MCNC: 0.6 MG/DL — SIGNIFICANT CHANGE UP (ref 0.5–1.3)
EGFR: 120 ML/MIN/1.73M2 — SIGNIFICANT CHANGE UP
GLUCOSE BLDC GLUCOMTR-MCNC: 203 MG/DL — HIGH (ref 70–99)
GLUCOSE BLDC GLUCOMTR-MCNC: 205 MG/DL — HIGH (ref 70–99)
GLUCOSE BLDC GLUCOMTR-MCNC: 266 MG/DL — HIGH (ref 70–99)
GLUCOSE BLDC GLUCOMTR-MCNC: 308 MG/DL — HIGH (ref 70–99)
GLUCOSE SERPL-MCNC: 235 MG/DL — HIGH (ref 70–99)
HCT VFR BLD CALC: 41 % — SIGNIFICANT CHANGE UP (ref 39–50)
HDLC SERPL-MCNC: 25 MG/DL — LOW
HGB BLD-MCNC: 14.2 G/DL — SIGNIFICANT CHANGE UP (ref 13–17)
LIPID PNL WITH DIRECT LDL SERPL: 158 MG/DL — HIGH
MCHC RBC-ENTMCNC: 31.7 PG — SIGNIFICANT CHANGE UP (ref 27–34)
MCHC RBC-ENTMCNC: 34.6 GM/DL — SIGNIFICANT CHANGE UP (ref 32–36)
MCV RBC AUTO: 91.5 FL — SIGNIFICANT CHANGE UP (ref 80–100)
NON HDL CHOLESTEROL: 203 MG/DL — HIGH
NRBC # BLD: 0 /100 WBCS — SIGNIFICANT CHANGE UP (ref 0–0)
PLATELET # BLD AUTO: 272 K/UL — SIGNIFICANT CHANGE UP (ref 150–400)
POTASSIUM SERPL-MCNC: 3.9 MMOL/L — SIGNIFICANT CHANGE UP (ref 3.5–5.3)
POTASSIUM SERPL-SCNC: 3.9 MMOL/L — SIGNIFICANT CHANGE UP (ref 3.5–5.3)
PROT SERPL-MCNC: 6.4 G/DL — SIGNIFICANT CHANGE UP (ref 6–8.3)
RBC # BLD: 4.48 M/UL — SIGNIFICANT CHANGE UP (ref 4.2–5.8)
RBC # FLD: 11.9 % — SIGNIFICANT CHANGE UP (ref 10.3–14.5)
SODIUM SERPL-SCNC: 140 MMOL/L — SIGNIFICANT CHANGE UP (ref 135–145)
TRIGL SERPL-MCNC: 224 MG/DL — HIGH
WBC # BLD: 5.14 K/UL — SIGNIFICANT CHANGE UP (ref 3.8–10.5)
WBC # FLD AUTO: 5.14 K/UL — SIGNIFICANT CHANGE UP (ref 3.8–10.5)

## 2022-05-30 PROCEDURE — 99232 SBSQ HOSP IP/OBS MODERATE 35: CPT

## 2022-05-30 RX ADMIN — Medication 3: at 07:54

## 2022-05-30 RX ADMIN — Medication 2: at 11:34

## 2022-05-30 RX ADMIN — Medication 6 UNIT(S): at 16:56

## 2022-05-30 RX ADMIN — Medication 6 UNIT(S): at 11:33

## 2022-05-30 RX ADMIN — Medication 6 UNIT(S): at 07:54

## 2022-05-30 RX ADMIN — Medication 2: at 21:37

## 2022-05-30 RX ADMIN — INSULIN GLARGINE 18 UNIT(S): 100 INJECTION, SOLUTION SUBCUTANEOUS at 07:53

## 2022-05-30 RX ADMIN — Medication 2: at 16:54

## 2022-05-30 NOTE — DIETITIAN INITIAL EVALUATION ADULT - LITERATURE/VIDEOS GIVEN
1. Heart Healthy Consistent Carbohydrate Nutrition Therapy (In [place])  2. Carbohydrate Counting for People with Diabetes (In [place])

## 2022-05-30 NOTE — DIETITIAN INITIAL EVALUATION ADULT - PERTINENT LABORATORY DATA
05-30    140  |  101  |  13  ----------------------------<  235<H>  3.9   |  27  |  0.60    Ca    8.4      30 May 2022 05:30  Phos  3.8     05-29  Mg     1.9     05-29    TPro  6.4  /  Alb  2.6<L>  /  TBili  0.4  /  DBili  0.1  /  AST  12  /  ALT  17  /  AlkPhos  93  05-30  POCT Blood Glucose.: 203 mg/dL (05-30-22 @ 11:32)  A1C with Estimated Average Glucose Result: >15.5 % (05-29-22 @ 06:30)

## 2022-05-30 NOTE — PROGRESS NOTE ADULT - ASSESSMENT
48 yo M with pmhx of DM2 and HLD presents for 3 days of generalized weakness and fatigue found with hyperglycemia, FS 400s, and positive for COVID.     DKA -resolved  - A1C >15.5   - needs diabetes educator   - lantus increased to 18 units in AM admelog and 6unit TIDAC  - ISS and monitor FS  - monitor BMP    Pseudohyponatremia secondary to hyperglycemia   - resolved    # COVID positive  - pt with mild symptoms. saturating well on RA  - no indications for COVID treatment at this time    # Mild transaminitis   - monitor     # HLD   - cont lipitor   - check lipid panel    # DVT ppx: encourage ambulation. low risks

## 2022-05-30 NOTE — DIETITIAN INITIAL EVALUATION ADULT - ORAL INTAKE PTA/DIET HISTORY
Pt reports good PO intake PTA. Eats 3 meals/day. Reports changing his diet recently (stopped drinking soda, juice, eats more fish + vegetables) and stopped drinking alcohol ~3 months ago. Pt reports intentional weight loss over the past 3 months since he stopped drinking. Pt's HbA1c is >15.5% indicating poor glycemic control. Pt admits that he doesn't monitor his blood sugar or take insulin @ home.

## 2022-05-30 NOTE — DIETITIAN INITIAL EVALUATION ADULT - OTHER INFO
46 yo M with pmhx of DM2 and HLD presents for 3 days of generalized weakness and fatigue found with hyperglycemia, FS 400s, and positive for COVID.     Pt receptive to written and verbal nutrition education on consistent carbohydrate diet + carbohydrate counting. Written materials provided in Maltese per pt preference.

## 2022-05-30 NOTE — DIETITIAN INITIAL EVALUATION ADULT - PERTINENT MEDS FT
MEDICATIONS  (STANDING):  dextrose 5%. 1000 milliLiter(s) (50 mL/Hr) IV Continuous <Continuous>  dextrose 5%. 1000 milliLiter(s) (100 mL/Hr) IV Continuous <Continuous>  dextrose 50% Injectable 25 Gram(s) IV Push once  dextrose 50% Injectable 12.5 Gram(s) IV Push once  dextrose 50% Injectable 25 Gram(s) IV Push once  glucagon  Injectable 1 milliGRAM(s) IntraMuscular once  influenza   Vaccine 0.5 milliLiter(s) IntraMuscular once  insulin glargine Injectable (LANTUS) 18 Unit(s) SubCutaneous every morning  insulin lispro (ADMELOG) corrective regimen sliding scale   SubCutaneous three times a day before meals  insulin lispro (ADMELOG) corrective regimen sliding scale   SubCutaneous at bedtime  insulin lispro Injectable (ADMELOG) 6 Unit(s) SubCutaneous three times a day before meals  sodium chloride 0.9%. 1000 milliLiter(s) (75 mL/Hr) IV Continuous <Continuous>    MEDICATIONS  (PRN):  acetaminophen     Tablet .. 650 milliGRAM(s) Oral every 6 hours PRN Temp greater or equal to 38C (100.4F), Mild Pain (1 - 3)  aluminum hydroxide/magnesium hydroxide/simethicone Suspension 30 milliLiter(s) Oral every 4 hours PRN Dyspepsia  dextrose Oral Gel 15 Gram(s) Oral once PRN Blood Glucose LESS THAN 70 milliGRAM(s)/deciliter  melatonin 3 milliGRAM(s) Oral at bedtime PRN Insomnia  ondansetron Injectable 4 milliGRAM(s) IV Push every 8 hours PRN Nausea and/or Vomiting

## 2022-05-31 ENCOUNTER — TRANSCRIPTION ENCOUNTER (OUTPATIENT)
Age: 47
End: 2022-05-31

## 2022-05-31 VITALS
DIASTOLIC BLOOD PRESSURE: 65 MMHG | RESPIRATION RATE: 15 BRPM | TEMPERATURE: 98 F | SYSTOLIC BLOOD PRESSURE: 107 MMHG | HEART RATE: 83 BPM | OXYGEN SATURATION: 98 %

## 2022-05-31 PROBLEM — E11.9 TYPE 2 DIABETES MELLITUS WITHOUT COMPLICATIONS: Chronic | Status: ACTIVE | Noted: 2022-05-29

## 2022-05-31 PROBLEM — E78.5 HYPERLIPIDEMIA, UNSPECIFIED: Chronic | Status: ACTIVE | Noted: 2022-05-29

## 2022-05-31 LAB
GLUCOSE BLDC GLUCOMTR-MCNC: 216 MG/DL — HIGH (ref 70–99)
GLUCOSE BLDC GLUCOMTR-MCNC: 250 MG/DL — HIGH (ref 70–99)

## 2022-05-31 PROCEDURE — 86341 ISLET CELL ANTIBODY: CPT

## 2022-05-31 PROCEDURE — 83735 ASSAY OF MAGNESIUM: CPT

## 2022-05-31 PROCEDURE — 80061 LIPID PANEL: CPT

## 2022-05-31 PROCEDURE — 83036 HEMOGLOBIN GLYCOSYLATED A1C: CPT

## 2022-05-31 PROCEDURE — 80076 HEPATIC FUNCTION PANEL: CPT

## 2022-05-31 PROCEDURE — 82803 BLOOD GASES ANY COMBINATION: CPT

## 2022-05-31 PROCEDURE — 96360 HYDRATION IV INFUSION INIT: CPT

## 2022-05-31 PROCEDURE — 81001 URINALYSIS AUTO W/SCOPE: CPT

## 2022-05-31 PROCEDURE — 87635 SARS-COV-2 COVID-19 AMP PRB: CPT

## 2022-05-31 PROCEDURE — 99239 HOSP IP/OBS DSCHRG MGMT >30: CPT

## 2022-05-31 PROCEDURE — 93005 ELECTROCARDIOGRAM TRACING: CPT

## 2022-05-31 PROCEDURE — 80048 BASIC METABOLIC PNL TOTAL CA: CPT

## 2022-05-31 PROCEDURE — 71045 X-RAY EXAM CHEST 1 VIEW: CPT

## 2022-05-31 PROCEDURE — 82962 GLUCOSE BLOOD TEST: CPT

## 2022-05-31 PROCEDURE — 85025 COMPLETE CBC W/AUTO DIFF WBC: CPT

## 2022-05-31 PROCEDURE — 99285 EMERGENCY DEPT VISIT HI MDM: CPT | Mod: 25

## 2022-05-31 PROCEDURE — 80053 COMPREHEN METABOLIC PANEL: CPT

## 2022-05-31 PROCEDURE — 36415 COLL VENOUS BLD VENIPUNCTURE: CPT

## 2022-05-31 PROCEDURE — 84100 ASSAY OF PHOSPHORUS: CPT

## 2022-05-31 PROCEDURE — 85027 COMPLETE CBC AUTOMATED: CPT

## 2022-05-31 RX ORDER — ENOXAPARIN SODIUM 100 MG/ML
20 INJECTION SUBCUTANEOUS
Qty: 1 | Refills: 6
Start: 2022-05-31 | End: 2022-12-26

## 2022-05-31 RX ORDER — ATORVASTATIN CALCIUM 80 MG/1
1 TABLET, FILM COATED ORAL
Qty: 0 | Refills: 0 | DISCHARGE

## 2022-05-31 RX ORDER — ATORVASTATIN CALCIUM 80 MG/1
1 TABLET, FILM COATED ORAL
Qty: 30 | Refills: 2
Start: 2022-05-31 | End: 2022-08-28

## 2022-05-31 RX ORDER — METFORMIN HYDROCHLORIDE 850 MG/1
1 TABLET ORAL
Qty: 60 | Refills: 2
Start: 2022-05-31 | End: 2022-08-28

## 2022-05-31 RX ORDER — LISINOPRIL 2.5 MG/1
1 TABLET ORAL
Qty: 30 | Refills: 2
Start: 2022-05-31 | End: 2022-08-28

## 2022-05-31 RX ORDER — INSULIN LISPRO 100/ML
8 VIAL (ML) SUBCUTANEOUS
Qty: 1 | Refills: 10
Start: 2022-05-31 | End: 2023-04-25

## 2022-05-31 RX ORDER — GLYBURIDE 5 MG
1 TABLET ORAL
Qty: 0 | Refills: 0 | DISCHARGE

## 2022-05-31 RX ADMIN — Medication 6 UNIT(S): at 08:04

## 2022-05-31 RX ADMIN — Medication 2: at 08:02

## 2022-05-31 RX ADMIN — Medication 2: at 11:44

## 2022-05-31 RX ADMIN — INSULIN GLARGINE 18 UNIT(S): 100 INJECTION, SOLUTION SUBCUTANEOUS at 09:04

## 2022-05-31 RX ADMIN — Medication 6 UNIT(S): at 11:44

## 2022-05-31 NOTE — ADVANCED PRACTICE NURSE CONSULT - RECOMMEDATIONS
BG Goal- 100- 180 mg/dl    Increase (LANTUS) from 18 to 20 Unit(s) SubCutaneous daily  C/W insulin lispro (ADMELOG) low corrective regimen sliding scale   SubCutaneous three times a day before meals  C/w low insulin lispro (ADMELOG) corrective regimen sliding scale   SubCutaneous at bedtime  Increase insulin lispro Injectable (ADMELOG) from 6 Unit to 8 unoits (s) SubCutaneous three times a day before meals  Obtain following Blood Work: Glutamic Acid Decarboxylase (BERNABE 65) Antibody, Islet Antigen (IA-2) antibody, & Zinc Transporter 8 AB (ZnT8) Antibody      Discharge Recommendations:   1. Lantus Solostar Pen 100 units/ml (on favorite list) 20 units SubCutaneous DAILY(on favorite list)  2. Humalog kwikpen 100 units/ml SubCutaneous, 8 before meals, three times/day before each meal (on favorite list)  3. Insulin pen needles 4 mm- daniel- (on favorite list)4. Alcohol swabs- (on favorite list)  5. BG meter per insurance- (on favorite list)  6. BG test strips per insurance- (on favorite list)  7. Lancets- per insurance -(on favorite list)  8. Metformin  mg tablets orally-Start at 500 mg twice a day with breakfast and dinner  9. Follow up with Dr Cain on June 8th in Family practice  10. Will need out pt ophthalmology once HgbA1c stable for 3 months and dental follow up.

## 2022-05-31 NOTE — DISCHARGE NOTE PROVIDER - HOSPITAL COURSE
Hospital Course  HPI:  48 yo M with pmhx of DM2 and HLD presents for 3 days of generalized weakness and fatigue. In ED, pt found with hyperglycemia,  and VBG showed pH 7.24 and bicarb 12. Pt also found positive for COVID. Pt admits to mild cough and nasal drainage for a few days, denies any fever, chills, SOB, CP, palpitations, abd pain, N/V/D. Pt also admits to recent weight loss, thirst, and frequent urination. Other ROS negative.  In ED, pt afebrile and VS stable. (29 May 2022 09:42)    You were admitted for Diabetic Ketoacidosis.      You were treated with IV fluids and insulin for your very high sugar.    You were prescribed the following new medications:    1. Lantus Solostar Pen 100 units/ml (on favorite list) 20 units SubCutaneous DAILY  2. Humalog kwikpen 100 units/ml SubCutaneous, 8 Units before meals, three times/day before each meal   3. Insulin pen needles 4 mm- daniel  4. Alcohol swabs  5. BG meter   6. BG test strips   7. Lancets  8. Metformin  mg tablets orally twice a day    You will need to follow up with your new primary care physician at the [facility].    Discharging Provider:  Zoran Morris MD  Contact Info: Cell [phone] - Please call with any questions or concerns.    Outpatient Provider:  Family Medicine Clinic

## 2022-05-31 NOTE — DISCHARGE NOTE PROVIDER - CARE PROVIDER_API CALL
Babs Crowell)  Family Medicine  [address] [place] [address]  Phone: [phone]  Fax: [phone]  Follow Up Time:    Compa Cain)  Family Medicine  [address] [place] [address]  Phone: [phone]  Fax: [phone]  Follow Up Time:

## 2022-05-31 NOTE — DISCHARGE NOTE PROVIDER - NSDCCPCAREPLAN_GEN_ALL_CORE_FT
PRINCIPAL DISCHARGE DIAGNOSIS  Diagnosis: DKA (diabetic ketoacidosis)  Assessment and Plan of Treatment: You were admitted for Diabetic Ketoacidosis.    You were treated with IV fluids and insulin for your very high sugar.    You were prescribed the following new medications:  1. Lantus Solostar Pen 100 units/ml (on favorite list) 20 units SubCutaneous DAILY  2. Humalog kwikpen 100 units/ml SubCutaneous, 8 Units before meals, three times/day before each meal   3. Insulin pen needles 4 mm- daniel  4. Alcohol swabs  5. BG meter   6. BG test strips   7. Lancets  8. Metformin  mg tablets orally twice a day  You will need to follow up with your new primary care physician at the [facility].

## 2022-05-31 NOTE — ADVANCED PRACTICE NURSE CONSULT - ASSESSMENT
HPI: Patient is a 47y old  Male who presents with a chief complaint of Type 2 diabetes mellitus with ketoacidosis without coma  HgbA1c- > 15. 5 (5/29/22)  eGFR- 120 (5/30/22)     POCT Blood Glucose.: 308 mg/dL (30 May 2022 21:35)  POCT Blood Glucose.: 205 mg/dL (30 May 2022 16:52)  POCT Blood Glucose.: 203 mg/dL (30 May 2022 11:32)    Current In-patient Diabetes Regimen  insulin glargine Injectable (LANTUS) 18 Unit(s) SubCutaneous every morning  insulin lispro (ADMELOG) corrective regimen sliding scale   SubCutaneous three times a day before meals  insulin lispro (ADMELOG) corrective regimen sliding scale   SubCutaneous at bedtime  insulin lispro Injectable (ADMELOG) 6 Unit(s) SubCutaneous three times a day before meals       Home Diabetes medications: Glyburide 5 mg twice a day prescribed over 1 year ago, last filled in Sept 2021 per [facility] pharmacy on [address], [place]- Pt admits to not taking.     Eyes- vision blurry- wears reading eyeglasses- does not know when last eye exam was. Educated on importance of annual dilated eye exam.    Teeth- denies issues- educated on importance of dental exam and cleaning every 6 months    Feet- denies issues- educated on daily diabetes foot care  Pt validated education via teach back.    Pt states he was dx with T2DM in past but he was not sure how long ago. Pt states he was prescribed Glyburide 5 mg twice a day but he rarely took. Checked with pt's pharmacy that pt last filled Glyburide in 9/2021. PT admitted toO ETOH consuming in excess of 12 bottles of Beer per day which he states he stopped around 8 months ago and since stopping drinking he has lost 100 lbs. Will recommend T1DM antibodies to r/o T1DM since pt reporting drastic weight loss and in DKA on admission. RN reports that pt family states pt still drinks. Discussed with Dr. Morris and since pt w/o s/s of ETOH withdraw and pt states he is not drinking will discharge on metformin for now, if T1DM or any signs of ETOH abuse on out patient follow up- recommend Metformin be discontinued.    Pt without any understanding of diabetes except that it "is extra sugar" in blood. Pt being discharged in a couple of hours- Educated pt on use of home BG meter, BG goals, keeping BG Log (provided log), bringing BG meter and Log, and all medications to each visit with PCP, Lantus and Humalog Insulin action, storage, pen use, injection technique and rotation of injection sites, sharps disposal, difference b/t long acting and rapid acting, Metformin action, dose potential GI side effects and contraindications of Metformin with alcohol consumption, , and s/s and tx of hypoglycemia. Educated pt to call PCP- Dr Cain for BG trending below 100 or above 200. Pt validated education with show back of BG meter and insulin pen simulation and teach back of other topics.  Pt will need reinforcement of this education as he states he is overwhelmed. Pt has many questions about diet. Provided a My Plate Handout in Wolof and advised pt that there are many things he still needs to learn including proper diet which he will learn on follow up appointments.   Provided written resources of Leaving the hospital with Diabetes, BG goals, A1c Goals, Preventing complications due to diabetes, Insulin pen use, sharps disposal, BG monitoring, S/S and Tx of hypoglycemia handout form from Learning about Diabetes.   Showed pt how to view Diabetes Education Videos and provided written directions to navigate to videos. Pt validated education via show back. Pt watched several videos

## 2022-05-31 NOTE — DISCHARGE NOTE PROVIDER - CARE PROVIDERS DIRECT ADDRESSES
[email or web address]

## 2022-05-31 NOTE — PROGRESS NOTE ADULT - SUBJECTIVE AND OBJECTIVE BOX
Patient is a 47y old  Male who presents with a chief complaint of hyperglycemia (29 May 2022 09:42)    Feels okay.  Denies chest pain, sob, nausea, vomiting.      Patient seen and examined at bedside. No overnight events reported.     ALLERGIES:  No Known Allergies    MEDICATIONS  (STANDING):  dextrose 5%. 1000 milliLiter(s) (50 mL/Hr) IV Continuous <Continuous>  dextrose 5%. 1000 milliLiter(s) (100 mL/Hr) IV Continuous <Continuous>  dextrose 50% Injectable 25 Gram(s) IV Push once  dextrose 50% Injectable 12.5 Gram(s) IV Push once  dextrose 50% Injectable 25 Gram(s) IV Push once  glucagon  Injectable 1 milliGRAM(s) IntraMuscular once  influenza   Vaccine 0.5 milliLiter(s) IntraMuscular once  insulin glargine Injectable (LANTUS) 18 Unit(s) SubCutaneous every morning  insulin lispro (ADMELOG) corrective regimen sliding scale   SubCutaneous three times a day before meals  insulin lispro (ADMELOG) corrective regimen sliding scale   SubCutaneous at bedtime  insulin lispro Injectable (ADMELOG) 6 Unit(s) SubCutaneous three times a day before meals  sodium chloride 0.9%. 1000 milliLiter(s) (75 mL/Hr) IV Continuous <Continuous>    MEDICATIONS  (PRN):  acetaminophen     Tablet .. 650 milliGRAM(s) Oral every 6 hours PRN Temp greater or equal to 38C (100.4F), Mild Pain (1 - 3)  aluminum hydroxide/magnesium hydroxide/simethicone Suspension 30 milliLiter(s) Oral every 4 hours PRN Dyspepsia  dextrose Oral Gel 15 Gram(s) Oral once PRN Blood Glucose LESS THAN 70 milliGRAM(s)/deciliter  melatonin 3 milliGRAM(s) Oral at bedtime PRN Insomnia  ondansetron Injectable 4 milliGRAM(s) IV Push every 8 hours PRN Nausea and/or Vomiting    Vital Signs Last 24 Hrs  T(F): 97.7 (30 May 2022 05:32), Max: 98.6 (29 May 2022 15:39)  HR: 73 (30 May 2022 05:32) (73 - 85)  BP: 110/71 (30 May 2022 05:32) (97/61 - 110/71)  RR: 15 (30 May 2022 05:32) (15 - 18)  SpO2: 98% (30 May 2022 05:32) (97% - 98%)  I&O's Summary    29 May 2022 07:01  -  30 May 2022 07:00  --------------------------------------------------------  IN: 825 mL / OUT: 0 mL / NET: 825 mL    30 May 2022 07:01  -  30 May 2022 13:11  --------------------------------------------------------  IN: 500 mL / OUT: 0 mL / NET: 500 mL    PHYSICAL EXAM:  General: NAD, A/O x 3  ENT: No gross hearing impairment, Moist mucous membranes, no thrush  Neck: Supple, No JVD  Lungs: Clear to auscultation bilaterally, good air entry, non-labored breathing  Cardio: RRR, S1/S2, No murmur  Abdomen: Soft, Nontender, Nondistended; Bowel sounds present  Extremities: No calf tenderness, No cyanosis, No pitting edema  Psych: Appropriate mood and affect    LABS:                        14.2   5.14  )-----------( 272      ( 30 May 2022 06:30 )             41.0     05-30    140  |  101  |  13  ----------------------------<  235  3.9   |  27  |  0.60    Ca    8.4      30 May 2022 05:30  Phos  3.8     05-29  Mg     1.9     -29    TPro  6.4  /  Alb  2.6  /  TBili  0.4  /  DBili  0.1  /  AST  12  /  ALT  17  /  AlkPhos  93  30                    0530 Chol 228 mg/dL LDL -- HDL 25 mg/dL Trig 224 mg/dL    06:30 - VBG - pH: 7.24  | pCO2: 28    | pO2: 68    | Lactate:                  POCT Blood Glucose.: 203 mg/dL (30 May 2022 11:32)  POCT Blood Glucose.: 266 mg/dL (30 May 2022 07:51)  POCT Blood Glucose.: 287 mg/dL (29 May 2022 23:08)  POCT Blood Glucose.: 248 mg/dL (29 May 2022 16:45)      Urinalysis Basic - ( 29 May 2022 06:30 )    Color: Yellow / Appearance: Clear / S.025 / pH: x  Gluc: x / Ketone: Large  / Bili: Negative / Urobili: Negative   Blood: x / Protein: 30 mg/dL / Nitrite: Negative   Leuk Esterase: Negative / RBC: Negative /HPF / WBC Negative /HPF   Sq Epi: x / Non Sq Epi: Neg.-Few / Bacteria: Trace /HPF        COVID-19 PCR: Detected (22 @ 07:30)    RADIOLOGY & ADDITIONAL TESTS:    Care Discussed with Consultants/Other Providers:   
Patient is a 47y old  Male who presents with a chief complaint of Type 2 diabetes mellitus with ketoacidosis without coma     (30 May 2022 14:17)      Patient seen and examined at bedside. No overnight events reported.     ALLERGIES:  No Known Allergies    MEDICATIONS  (STANDING):  dextrose 5%. 1000 milliLiter(s) (50 mL/Hr) IV Continuous <Continuous>  dextrose 5%. 1000 milliLiter(s) (100 mL/Hr) IV Continuous <Continuous>  dextrose 50% Injectable 25 Gram(s) IV Push once  dextrose 50% Injectable 12.5 Gram(s) IV Push once  dextrose 50% Injectable 25 Gram(s) IV Push once  glucagon  Injectable 1 milliGRAM(s) IntraMuscular once  influenza   Vaccine 0.5 milliLiter(s) IntraMuscular once  insulin glargine Injectable (LANTUS) 18 Unit(s) SubCutaneous every morning  insulin lispro (ADMELOG) corrective regimen sliding scale   SubCutaneous three times a day before meals  insulin lispro (ADMELOG) corrective regimen sliding scale   SubCutaneous at bedtime  insulin lispro Injectable (ADMELOG) 6 Unit(s) SubCutaneous three times a day before meals  sodium chloride 0.9%. 1000 milliLiter(s) (75 mL/Hr) IV Continuous <Continuous>    MEDICATIONS  (PRN):  acetaminophen     Tablet .. 650 milliGRAM(s) Oral every 6 hours PRN Temp greater or equal to 38C (100.4F), Mild Pain (1 - 3)  aluminum hydroxide/magnesium hydroxide/simethicone Suspension 30 milliLiter(s) Oral every 4 hours PRN Dyspepsia  dextrose Oral Gel 15 Gram(s) Oral once PRN Blood Glucose LESS THAN 70 milliGRAM(s)/deciliter  melatonin 3 milliGRAM(s) Oral at bedtime PRN Insomnia  ondansetron Injectable 4 milliGRAM(s) IV Push every 8 hours PRN Nausea and/or Vomiting    Vital Signs Last 24 Hrs  T(F): 98.2 (30 May 2022 20:30), Max: 98.2 (30 May 2022 20:30)  HR: 72 (30 May 2022 20:30) (72 - 73)  BP: 102/69 (30 May 2022 20:30) (102/69 - 105/72)  RR: 16 (30 May 2022 20:30) (14 - 16)  SpO2: 97% (30 May 2022 20:30) (97% - 98%)  I&O's Summary    30 May 2022 07:01  -  31 May 2022 07:00  --------------------------------------------------------  IN: 1000 mL / OUT: 0 mL / NET: 1000 mL      PHYSICAL EXAM:  General: NAD, A/O x 3  ENT: No gross hearing impairment, Moist mucous membranes, no thrush  Neck: Supple, No JVD  Lungs: Clear to auscultation bilaterally, good air entry, non-labored breathing  Cardio: RRR, S1/S2, No murmur  Abdomen: Soft, Nontender, Nondistended; Bowel sounds present  Extremities: No calf tenderness, No cyanosis, No pitting edema  Psych: Appropriate mood and affect    LABS:                        14.2   5.14  )-----------( 272      ( 30 May 2022 06:30 )             41.0     -30    140  |  101  |  13  ----------------------------<  235  3.9   |  27  |  0.60    Ca    8.4      30 May 2022 05:30  Phos  3.8     05-  Mg     1.9         TPro  6.4  /  Alb  2.6  /  TBili  0.4  /  DBili  0.1  /  AST  12  /  ALT  17  /  AlkPhos  93  30     Chol 228 mg/dL LDL -- HDL 25 mg/dL Trig 224 mg/dL    06:30 - VBG - pH: 7.24  | pCO2: 28    | pO2: 68    | Lactate:          POCT Blood Glucose.: 308 mg/dL (30 May 2022 21:35)  POCT Blood Glucose.: 205 mg/dL (30 May 2022 16:52)  POCT Blood Glucose.: 203 mg/dL (30 May 2022 11:32)    Urinalysis Basic - ( 29 May 2022 06:30 )    Color: Yellow / Appearance: Clear / S.025 / pH: x  Gluc: x / Ketone: Large  / Bili: Negative / Urobili: Negative   Blood: x / Protein: 30 mg/dL / Nitrite: Negative   Leuk Esterase: Negative / RBC: Negative /HPF / WBC Negative /HPF   Sq Epi: x / Non Sq Epi: Neg.-Few / Bacteria: Trace /HPF    COVID-19 PCR: Detected (22 @ 07:30)    RADIOLOGY & ADDITIONAL TESTS:    Care Discussed with Consultants/Other Providers:

## 2022-05-31 NOTE — PROGRESS NOTE ADULT - ASSESSMENT
48 yo M with pmhx of DM2 and HLD presents for 3 days of generalized weakness and fatigue found with hyperglycemia, FS 400s, and positive for COVID.     DKA -resolved  Uncontrolled DM2  - A1C >15.5   - needs diabetes educator   - lantus increased to 18 units in AM admelog and 6unit TIDAC  - ISS and monitor FS  - monitor BMP    Pseudohyponatremia secondary to hyperglycemia   - resolved    # COVID positive  - pt with mild symptoms. saturating well on RA  - no indications for COVID treatment at this time    # Mild transaminitis   - monitor     # HLD   - cont lipitor   - check lipid panel    # DVT ppx: encourage ambulation. low risks    Suspect d/c home with close follow up with diabetes specialist

## 2022-05-31 NOTE — DISCHARGE NOTE NURSING/CASE MANAGEMENT/SOCIAL WORK - PATIENT PORTAL LINK FT
You can access the FollowMyHealth Patient Portal offered by [facility] by registering at the following website: [email or web address]. By joining [facility]’s FollowMyHealth portal, you will also be able to view your health information using other applications (apps) compatible with our system.

## 2022-05-31 NOTE — DISCHARGE NOTE PROVIDER - NSDCMRMEDTOKEN_GEN_ALL_CORE_FT
glucometer (per patient&#x27;s insurance): Test blood sugars four times a day. Dispense #1 glucometer.  HumaLOG KwikPen 100 units/mL injectable solution: 8 unit(s) injectable 3 times a day (before meals)   Insulin Pen Needles, 4mm: 1 application subcutaneously 4 times a day. ** Use with insulin pen **   lancets: 1 application subcutaneously 4 times a day   Lantus Solostar Pen 100 units/mL subcutaneous solution: 20 unit(s) subcutaneous once a day   Lipitor 20 mg oral tablet: 1 tab(s) orally once a day  lisinopril 5 mg oral tablet: 1 tab(s) orally once a day   metFORMIN 500 mg oral tablet: 1 tab(s) orally 2 times a day   test strips (per patient&#x27;s insurance): 1 application subcutaneously 4 times a day. ** Compatible with patient&#x27;s glucometer **

## 2022-05-31 NOTE — PHARMACOTHERAPY INTERVENTION NOTE - COMMENTS
Confirmed with patient's pharmacy regarding coverage and availability of patient's new diabetes regimens.

## 2022-05-31 NOTE — DISCHARGE NOTE PROVIDER - NSDCFUSCHEDAPPT_GEN_ALL_CORE_FT
[facility]  [address]  Scheduled Appointment: 06/08/2022

## 2022-05-31 NOTE — DISCHARGE NOTE NURSING/CASE MANAGEMENT/SOCIAL WORK - NSDCPEFALRISK_GEN_ALL_CORE
For information on Fall & Injury Prevention, visit: [email or web address] OR  [email or web address] OR  https://www.cdc.gov/steadi/patient.html

## 2022-06-02 LAB — ISLET CELL512 AB SER-ACNC: SIGNIFICANT CHANGE UP

## 2022-06-05 LAB
GAD65 AB SER-MCNC: 0 NMOL/L — SIGNIFICANT CHANGE UP
ISLET CELL512 AB SER-SCNC: 0 NMOL/L — SIGNIFICANT CHANGE UP
ZINC TRANSPORTER 8 AB, RESULT: <15 U/ML — SIGNIFICANT CHANGE UP

## 2022-06-08 ENCOUNTER — APPOINTMENT (OUTPATIENT)
Dept: FAMILY MEDICINE | Facility: CLINIC | Age: 47
End: 2022-06-08
Payer: COMMERCIAL

## 2022-06-08 ENCOUNTER — NON-APPOINTMENT (OUTPATIENT)
Age: 47
End: 2022-06-08

## 2022-06-08 VITALS
SYSTOLIC BLOOD PRESSURE: 101 MMHG | HEIGHT: 67 IN | HEART RATE: 76 BPM | DIASTOLIC BLOOD PRESSURE: 66 MMHG | RESPIRATION RATE: 12 BRPM | OXYGEN SATURATION: 97 % | WEIGHT: 184 LBS | BODY MASS INDEX: 28.88 KG/M2 | TEMPERATURE: 97.7 F

## 2022-06-08 DIAGNOSIS — Z12.5 ENCOUNTER FOR SCREENING FOR MALIGNANT NEOPLASM OF PROSTATE: ICD-10-CM

## 2022-06-08 DIAGNOSIS — Z82.49 FAMILY HISTORY OF ISCHEMIC HEART DISEASE AND OTHER DISEASES OF THE CIRCULATORY SYSTEM: ICD-10-CM

## 2022-06-08 DIAGNOSIS — F41.9 ANXIETY DISORDER, UNSPECIFIED: ICD-10-CM

## 2022-06-08 DIAGNOSIS — Z12.11 ENCOUNTER FOR SCREENING FOR MALIGNANT NEOPLASM OF COLON: ICD-10-CM

## 2022-06-08 DIAGNOSIS — Z00.00 ENCOUNTER FOR GENERAL ADULT MEDICAL EXAMINATION W/OUT ABNORMAL FINDINGS: ICD-10-CM

## 2022-06-08 DIAGNOSIS — Z83.3 FAMILY HISTORY OF DIABETES MELLITUS: ICD-10-CM

## 2022-06-08 DIAGNOSIS — R53.83 OTHER FATIGUE: ICD-10-CM

## 2022-06-08 PROCEDURE — 99386 PREV VISIT NEW AGE 40-64: CPT

## 2022-06-08 RX ORDER — AMOXICILLIN AND CLAVULANATE POTASSIUM 875; 125 MG/1; MG/1
875-125 TABLET, COATED ORAL
Qty: 16 | Refills: 0 | Status: DISCONTINUED | COMMUNITY
Start: 2017-08-10 | End: 2022-06-08

## 2022-06-14 PROBLEM — Z83.3 FAMILY HISTORY OF DIABETES MELLITUS: Status: ACTIVE | Noted: 2022-06-14

## 2022-06-14 PROBLEM — Z82.49 FAMILY HISTORY OF HYPERTENSION: Status: ACTIVE | Noted: 2022-06-14

## 2022-06-14 NOTE — HISTORY OF PRESENT ILLNESS
[FreeTextEntry8] : Presents s/p recent hospitalization, DKA and covid +. Checks only fasting blood sugars. \par \par meds: lantus 20 qhs, 8 units before meals, metformin tid\par \par children: 19 yo, 7 yo, 7 yo all healthy\par \par denies cancers in the family\par \par originally from [facility]\par \par smoking history: smoked 1 ppd since 10 years old until age 23 years old (13 pack years)\par \par quit alcohol 1 year ago, used to drink beer

## 2022-06-14 NOTE — ASSESSMENT
[FreeTextEntry1] : referral to diabetic educator\par d/w patient diet/exercise, diabetic education \par RTO 1 month\par

## 2022-06-22 ENCOUNTER — NON-APPOINTMENT (OUTPATIENT)
Age: 47
End: 2022-06-22

## 2022-07-01 ENCOUNTER — NON-APPOINTMENT (OUTPATIENT)
Age: 47
End: 2022-07-01

## 2022-07-05 ENCOUNTER — APPOINTMENT (OUTPATIENT)
Dept: FAMILY MEDICINE | Facility: CLINIC | Age: 47
End: 2022-07-05

## 2022-07-05 PROCEDURE — 99215 OFFICE O/P EST HI 40 MIN: CPT

## 2022-07-05 RX ORDER — LANCETS
EACH MISCELLANEOUS
Qty: 1 | Refills: 3 | Status: ACTIVE | COMMUNITY
Start: 2022-07-05 | End: 1900-01-01

## 2022-07-05 RX ORDER — BLOOD-GLUCOSE METER
W/DEVICE KIT MISCELLANEOUS
Qty: 1 | Refills: 0 | Status: ACTIVE | COMMUNITY
Start: 2022-07-05 | End: 1900-01-01

## 2022-07-05 NOTE — REASON FOR VISIT
[Initial Evaluation] : an initial evaluation [DM Type 2] : DM Type 2 [Patient Declined  Services] : - None: Patient declined  services [FreeTextEntry2] : DR YE

## 2022-07-05 NOTE — REVIEW OF SYSTEMS
[Fatigue] : no fatigue [Recent Weight Loss (___ Lbs)] : recent weight loss: [unfilled] lbs [Blurred Vision] : no blurred vision [Chest Pain] : no chest pain [Shortness Of Breath] : no shortness of breath [Nausea] : no nausea [Diarrhea] : no diarrhea [Gas/Bloating] : no gas/bloating [Polyuria] : no polyuria [Headaches] : no headaches [Polydipsia] : no polydipsia [FreeTextEntry3] : REPORTS BLURRED VISION HAS RESOLVED

## 2022-07-05 NOTE — HISTORY OF PRESENT ILLNESS
[FreeTextEntry1] : HERE TODAY FOR INITIAL DIABETES APPOINTMENT\par FEELS WELL\par RECENTLY HOSPITALIZED W/ HYPERGLYCEMIA  AND DKA\par A1C > 15.5% 5/29/22\par TESTING FOR TYPE 1 DIABETES WAS NEGATIVE\par REPORTS POLYURIA, POLYDIPSIA AND UNINTENTIONAL WEIGHT LOSS HAS RESOLVED.  HAD LOST ABOUT 80 LBS UNINTENTIONALLY.\par REPORTS MUCH MORE ENERGY\par PREVIOUSLY DRANK APPROX. 12 BEERS EVERY NIGHT.  STATES HE STOPPED IMMEDIATELY AFTER HOSPITALIZATION WITHOUT ASSISTANCE AND FEELS REALLY GREAT, WITH NO PLANS ON DRINKING AGAIN\par .REPORTS COMPLIANCE W/ METFORMIN 500 MG BEFORE BREAKFAST AND DINNER \par REPORTS COMPLIANCE W/ LANTUS   20   UNITS  QHS AND HUMALOG 8 UNITS PRE-MEAL\par HAS TESTED BG  < 10 x SINCE DISCHARGE.  REPORTS READINGS [id number] ALL FASTING\par NO READINGS < 70 OR SIGNS AND SYMPTOMS OF HYPOGLYCEMIA \par ORIGINALLY FROM [facility].  LIVES W/ SPOUSE AND 3 CHILDREN\par DOES NOT EXERCISE\par .NEEDS EYE EXAM

## 2022-07-05 NOTE — ASSESSMENT
[FreeTextEntry1] : PT WAS EDUCATED ON SIGNIFICANCE OF A1C OF > 15.5%  ON LONG AND SHORT TERM COMPLICATIONS \par EXPLAINED THE PATHOPHYSIOLOGY OF TYPE 2 DIABETES AND TREATMENT.  EXPLAINED THAT PEOPLE OFTEN NEED FOR MULTIPLE AGENTS IN ADDITION TO LIFESTYLE CHANGES TO CONTROL BG AND HELP PREVENT COMPLICATIONS. \par TREATMENT OPTIONS DISCUSSED TO HELP GET A1C CLOSER TO GOAL - GLP1 vs SGLT2.  RISKS/BENEFITS AND SIDE EFFECT PROFILE EXPLAINED AND DISCUSSED.  PT OPTED FOR SGLT2\par EDUCATED ON POSSIBLE SIDE EFFECTS OF JARDIANCE INCLUDING UTI, GENITAL INFECTIONS AND DEHYDRATION.  INSTRUCTED TO STOP TAKING IMMEDIATELY IF HE EXPERIENCES THIS AND CALL OFFICE.  JARDIANCE CAN LOWER BP, SO BP MEDS MAY NEED TO BE ADJUSTED.  ANY FEELINGS OF LIGHTHEADEDNESS OR DIZZINESS SHOULD BE REPORTED TO HEALTHCARE PROVIDER.  EDUCATED ON IMPORTANCE OF ADEQUATE FLUID INTAKE AND GOOD HYGIENE PRACTICES TO LOWER RISK OF UTI OR GENITAL INFECTIONS.     INSTRUCTED TO HOLD IF NOT DRINKING ADEQUATE FLUID OR IF EXPERIENCING DIARRHEA (CAN RESUME WHEN THIS RESOLVES).   INFORMED THAT THIS MEDICATION WILL LIKELY LOWER BG AND CURRENT INSULIN DOSES MAY NEED TO BE ADJUSTED IF READINGS CONSISTENTLY < 90 \par EDUCATED ON PAIRED TESTING OF BG.  ENCOURAGED TO TEST BEFORE A MEAL AND THEN 2 HOURS AFTER THAT MEAL TO ASSESS RESPONSE TO THAT MEAL.  ENCOURAGED TO VARY MEALS TESTED AND RECORD. \par \par INSTRUCTED TO CONTINUE LANTUS 20 UNITS QHS AND HUMALOG 8 UNITS PRE-MEAL\par INCREASE METFORMIN FROM 500 TO 1,000 MG W/ BREAKFAST AND DINNER\par .INSTRUCTED TO CALL OFFICE FOR BG < 70, > 250 OR FOR ANY QUESTIONS OR CONCERNS\par SCHEDULED TO RETURN TO OFFICE ON 8/2/22 [Diabetes Foot Care] : diabetes foot care [Long Term Vascular Complications] : long term vascular complications of diabetes [Carbohydrate Consistent Diet] : carbohydrate consistent diet [Importance of Diet and Exercise] : importance of diet and exercise to improve glycemic control, achieve weight loss and improve cardiovascular health [Exercise/Effect on Glucose] : exercise/effect on glucose [Hypoglycemia Management] : hypoglycemia management [Self Monitoring of Blood Glucose] : self monitoring of blood glucose [Retinopathy Screening] : Patient was referred to ophthalmology for retinopathy screening

## 2022-07-05 NOTE — PHYSICAL EXAM
[Alert] : alert [No Acute Distress] : no acute distress [No Respiratory Distress] : no respiratory distress [Normal Gait] : normal gait [Foot Ulcers] : no foot ulcers [Right foot was examined, including] : right foot ~C was examined, including visual inspection with sensory and pulse exams [Left foot was examined, including] : left foot ~C was examined, including visual inspection with sensory and pulse exams [Normal] : normal [2+] : 2+ in the dorsalis pedis [Diminished Throughout Both Feet] : normal tactile sensation with monofilament testing throughout both feet [Oriented x3] : oriented to person, place, and time [Normal Affect] : the affect was normal [Recent Memory Normal] : recent memory was not impaired [Normal Insight/Judgement] : insight and judgment were intact [Normal Mood] : the mood was normal [Remote Memory Normal] : remote memory was not impaired

## 2022-07-18 ENCOUNTER — NON-APPOINTMENT (OUTPATIENT)
Age: 47
End: 2022-07-18

## 2022-08-01 ENCOUNTER — NON-APPOINTMENT (OUTPATIENT)
Age: 47
End: 2022-08-01

## 2022-08-02 ENCOUNTER — APPOINTMENT (OUTPATIENT)
Dept: FAMILY MEDICINE | Facility: CLINIC | Age: 47
End: 2022-08-02

## 2022-08-02 PROCEDURE — 99215 OFFICE O/P EST HI 40 MIN: CPT

## 2022-08-02 RX ORDER — LISINOPRIL 5 MG/1
5 TABLET ORAL
Qty: 30 | Refills: 3 | Status: ACTIVE | COMMUNITY
Start: 2022-07-05 | End: 1900-01-01

## 2022-08-03 NOTE — PHYSICAL EXAM
[Alert] : alert [No Acute Distress] : no acute distress [No Respiratory Distress] : no respiratory distress [Normal Gait] : normal gait [Right foot was examined, including] : right foot ~C was examined, including visual inspection with sensory and pulse exams [Left foot was examined, including] : left foot ~C was examined, including visual inspection with sensory and pulse exams [Normal] : normal [2+] : 2+ in the dorsalis pedis [Oriented x3] : oriented to person, place, and time [Normal Affect] : the affect was normal [Recent Memory Normal] : recent memory was not impaired [Normal Insight/Judgement] : insight and judgment were intact [Normal Mood] : the mood was normal [Remote Memory Normal] : remote memory was not impaired [Foot Ulcers] : no foot ulcers [Diminished Throughout Both Feet] : normal tactile sensation with monofilament testing throughout both feet [FreeTextEntry2] : CALLOUS LATERAL GREAT TOE [FreeTextEntry6] : CALLOUS LATERAL GREAT TOE

## 2022-08-03 NOTE — REVIEW OF SYSTEMS
[Fatigue] : no fatigue [Blurred Vision] : no blurred vision [Chest Pain] : no chest pain [Shortness Of Breath] : no shortness of breath [Nausea] : no nausea [Diarrhea] : no diarrhea [Gas/Bloating] : no gas/bloating [Polyuria] : no polyuria [Joint Pain] : no joint pain [Headaches] : no headaches [Depression] : no depression [Polydipsia] : no polydipsia

## 2022-08-03 NOTE — ASSESSMENT
[FreeTextEntry1] : \par PT REQUESTED ALL MEDS TO GO TO [facility] ON [address]\par WILL CALL PHARMACY TO SEE IF PA OR ANOTHER SGLT2 IS NEEDED\par INSTRUCTED PT TO BE SURE THAT PHARMACY HAS INSURANCE INFORMATION\par EDUCATED ON POSSIBLE SIDE EFFECTS OF JARDIANCE INCLUDING UTI, GENITAL INFECTIONS AND DEHYDRATION. INSTRUCTED TO STOP TAKING IMMEDIATELY IF HE EXPERIENCES THIS AND CALL OFFICE. JARDIANCE CAN LOWER BP, SO BP MEDS MAY NEED TO BE ADJUSTED. ANY FEELINGS OF LIGHTHEADEDNESS OR DIZZINESS SHOULD BE REPORTED TO HEALTHCARE PROVIDER. EDUCATED ON IMPORTANCE OF ADEQUATE FLUID INTAKE AND GOOD HYGIENE PRACTICES TO LOWER RISK OF UTI OR GENITAL INFECTIONS. INSTRUCTED TO HOLD IF NOT DRINKING ADEQUATE FLUID OR IF EXPERIENCING DIARRHEA (CAN RESUME WHEN THIS RESOLVES). INFORMED THAT THIS MEDICATION WILL LIKELY LOWER BG AND CURRENT INSULIN DOSES MAY NEED TO BE ADJUSTED IF READINGS CONSISTENTLY < 90 \par EDUCATED ON PAIRED TESTING OF BG. ENCOURAGED TO TEST BEFORE A MEAL AND THEN 2 HOURS AFTER THAT MEAL TO ASSESS RESPONSE TO THAT MEAL. ENCOURAGED TO VARY MEALS TESTED AND RECORD. \par \par INSTRUCTED TO CONTINUE LANTUS 20 UNITS QHS AND HUMALOG 8 UNITS PRE-MEAL\par INCREASE METFORMIN FROM 500 TO 1,000 MG W/ BREAKFAST AND DINNER\par ENCOURAGED SLOW, STEADY WEIGHT LOSS OF 1-2 LBS EACH WEEK\par ENCOURAGED PHYSICAL ACTIVITY 30 MINUTES 5 DAYS EACH WEEK AS TOLERATED \par .INSTRUCTED TO CALL OFFICE FOR BG < 70, > 250 OR FOR ANY QUESTIONS OR CONCERNS\par SCHEDULED TO RETURN TO OFFICE ON 8/2/22. \par \par  [Diabetes Foot Care] : diabetes foot care [Long Term Vascular Complications] : long term vascular complications of diabetes [Carbohydrate Consistent Diet] : carbohydrate consistent diet [Importance of Diet and Exercise] : importance of diet and exercise to improve glycemic control, achieve weight loss and improve cardiovascular health [Exercise/Effect on Glucose] : exercise/effect on glucose [Hypoglycemia Management] : hypoglycemia management [Action and use of Insulin] : action and use of short and long-acting insulin [Self Monitoring of Blood Glucose] : self monitoring of blood glucose [Insulin Self-Administration] : insulin self-administration [Injection Technique, Storage, Sharps Disposal] : injection technique, storage, and sharps disposal [Retinopathy Screening] : Patient was referred to ophthalmology for retinopathy screening [Weight Loss] : weight loss

## 2022-08-03 NOTE — HISTORY OF PRESENT ILLNESS
[FreeTextEntry1] : HERE TODAY FOR INITIAL DIABETES APPOINTMENT\par FEELS WELL\par RECENTLY HOSPITALIZED W/ HYPERGLYCEMIA AND DKA\par A1C > 15.5% 5/29/22\par TESTING FOR TYPE 1 DIABETES WAS NEGATIVE\par REPORTS POLYURIA, POLYDIPSIA AND UNINTENTIONAL WEIGHT LOSS HAS RESOLVED. HAD LOST ABOUT 80 LBS UNINTENTIONALLY.\par REPORTS MUCH MORE ENERGY\par PREVIOUSLY DRANK APPROX. 12 BEERS EVERY NIGHT. STATES HE STOPPED IMMEDIATELY AFTER HOSPITALIZATION WITHOUT ASSISTANCE AND FEELS REALLY GREAT, WITH NO PLANS ON DRINKING AGAIN\par .REPORTS COMPLIANCE W/ METFORMIN 500 MG BEFORE BREAKFAST AND DINNER - DID NOT INCREASE TO 1,000 MG BID - STATES HE DID NOT  MEDICATION\par REPORTS COMPLIANCE W/ LANTUS 20 UNITS QHS AND HUMALOG 8 UNITS PRE-MEAL\par DID NOT  JARDIANCE - STATES THE PHARMACY WANTED $1800 \par TESTING BG EVERY 2-3 DAY WITH READINGS [id number]\par NO READINGS < 70 OR SIGNS AND SYMPTOMS OF HYPOGLYCEMIA \par DOES NOT EXERCISE\par .NEEDS EYE EXAM \par

## 2022-08-05 ENCOUNTER — NON-APPOINTMENT (OUTPATIENT)
Age: 47
End: 2022-08-05

## 2022-08-08 ENCOUNTER — APPOINTMENT (OUTPATIENT)
Dept: FAMILY MEDICINE | Facility: CLINIC | Age: 47
End: 2022-08-08

## 2022-08-08 VITALS
SYSTOLIC BLOOD PRESSURE: 121 MMHG | DIASTOLIC BLOOD PRESSURE: 74 MMHG | WEIGHT: 193 LBS | TEMPERATURE: 97.5 F | HEART RATE: 93 BPM | OXYGEN SATURATION: 97 % | RESPIRATION RATE: 16 BRPM | BODY MASS INDEX: 30.23 KG/M2

## 2022-08-08 DIAGNOSIS — R20.2 PARESTHESIA OF SKIN: ICD-10-CM

## 2022-08-08 PROCEDURE — 99214 OFFICE O/P EST MOD 30 MIN: CPT

## 2022-08-08 RX ORDER — EMPAGLIFLOZIN 10 MG/1
10 TABLET, FILM COATED ORAL DAILY
Qty: 30 | Refills: 5 | Status: COMPLETED | COMMUNITY
Start: 2022-07-05 | End: 2022-08-08

## 2022-08-08 RX ORDER — INSULIN GLARGINE 100 [IU]/ML
100 INJECTION, SOLUTION SUBCUTANEOUS
Qty: 1 | Refills: 3 | Status: COMPLETED | COMMUNITY
Start: 2022-07-05 | End: 2022-08-08

## 2022-08-08 RX ORDER — PEN NEEDLE, DIABETIC 32GX 5/32"
32G X 4 MM NEEDLE, DISPOSABLE MISCELLANEOUS
Qty: 2 | Refills: 3 | Status: COMPLETED | COMMUNITY
Start: 2022-07-05 | End: 2022-08-08

## 2022-08-08 NOTE — PHYSICAL EXAM
[No Acute Distress] : no acute distress [Well Nourished] : well nourished [Well Developed] : well developed [Well-Appearing] : well-appearing [Normal Voice/Communication] : normal voice/communication [Normal Sclera/Conjunctiva] : normal sclera/conjunctiva [Normal Outer Ear/Nose] : the outer ears and nose were normal in appearance [Normal Oropharynx] : the oropharynx was normal [Supple] : supple [No Respiratory Distress] : no respiratory distress  [No Accessory Muscle Use] : no accessory muscle use [Clear to Auscultation] : lungs were clear to auscultation bilaterally [Normal Rate] : normal rate  [Regular Rhythm] : with a regular rhythm [Normal S1, S2] : normal S1 and S2 [Coordination Grossly Intact] : coordination grossly intact [No Focal Deficits] : no focal deficits [Normal Gait] : normal gait [Speech Grossly Normal] : speech grossly normal [Memory Grossly Normal] : memory grossly normal [Normal Affect] : the affect was normal [Alert and Oriented x3] : oriented to person, place, and time [Normal Mood] : the mood was normal [Normal Insight/Judgement] : insight and judgment were intact

## 2022-09-03 NOTE — HISTORY OF PRESENT ILLNESS
[FreeTextEntry1] : Presents for f/u diabetes. Has seen Marie Olivarez for diabetes recently. Taking a supplement called neruobium for tinginling in his hands. \par \par supplements: neurobium b12\par \par ROS: negative except as noted above\par

## 2022-10-14 ENCOUNTER — NON-APPOINTMENT (OUTPATIENT)
Age: 47
End: 2022-10-14

## 2022-10-17 ENCOUNTER — APPOINTMENT (OUTPATIENT)
Dept: FAMILY MEDICINE | Facility: CLINIC | Age: 47
End: 2022-10-17

## 2022-10-17 DIAGNOSIS — E11.65 TYPE 2 DIABETES MELLITUS WITH HYPERGLYCEMIA: ICD-10-CM

## 2022-10-17 DIAGNOSIS — E78.5 HYPERLIPIDEMIA, UNSPECIFIED: ICD-10-CM

## 2022-10-17 LAB
HBA1C MFR BLD HPLC: ABNORMAL
HBA1C MFR BLD HPLC: NORMAL

## 2022-10-17 PROCEDURE — 83036 HEMOGLOBIN GLYCOSYLATED A1C: CPT | Mod: QW

## 2022-10-17 PROCEDURE — 99215 OFFICE O/P EST HI 40 MIN: CPT | Mod: 25

## 2022-10-18 NOTE — PHYSICAL EXAM
[Alert] : alert [No Acute Distress] : no acute distress [No Respiratory Distress] : no respiratory distress [Normal Gait] : normal gait [Oriented x3] : oriented to person, place, and time [Normal Affect] : the affect was normal [Recent Memory Normal] : recent memory was not impaired [Normal Insight/Judgement] : insight and judgment were intact [Normal Mood] : the mood was normal [Remote Memory Normal] : remote memory was not impaired [Foot Ulcers] : no foot ulcers

## 2022-10-18 NOTE — REVIEW OF SYSTEMS
[As Noted in HPI] : as noted in HPI [Fatigue] : no fatigue [Blurred Vision] : no blurred vision [Dysphagia] : no dysphagia [Chest Pain] : no chest pain [Shortness Of Breath] : no shortness of breath [Nausea] : no nausea [Gas/Bloating] : no gas/bloating [Diarrhea] : no diarrhea [Polyuria] : no polyuria [Depression] : no depression [Polydipsia] : no polydipsia

## 2022-10-18 NOTE — ASSESSMENT
[FreeTextEntry1] : A1C TODAY 7.3% DOWN FROM > 15.5% ON 5/29/22\par PT CONGRATULATED ON ALL EFFORTS TO IMPROVED BG AND MINIMIZE COMPLICATIONS\par UNABLE TO AFFORD SGLT2 OR GLP1 DUE TO INADEQUATE PRES[employer] COVERAGE COVERAGE\par \par INSTRUCTED TO CONTINUE LANTUS (OR BASAGLAR - DEPENDING ON AFFORDABILITY) 20 UNITS QHS AND HUMALOG 8 UNITS PRE-MEAL.  IF HE DECIDES TO TAKE THE METFORMIN 500 MG BID, HE SHOULD ONLY TAKE 16 UNITS OF LANTUS QHS  AND 5 UNITS HUMALOG PRE-MEAL \par EXPLAINED IMPORTANCE AND PURPOSE OF METFORMIN, LISINOPRIL AND ATORVASTATIN.  STRONGLY ENCOURAGED TO TAKE ALL AS PRESCRIBED\par EDUCATED ON PAIRED TESTING OF BG.  ENCOURAGED TO TEST BEFORE A MEAL AND THEN 2 HOURS AFTER THAT MEAL TO ASSESS RESPONSE TO THAT MEAL.  ENCOURAGED TO VARY MEALS TESTED AND RECORD. \par \par ENCOURAGED SLOW, STEADY WEIGHT LOSS OF 1-2 LBS EACH WEEK\par ENCOURAGED PHYSICAL ACTIVITY 30 MINUTES 5 DAYS EACH WEEK AS TOLERATED \par LENGTHY DISCUSSION ON TIPS AND TECHNIQUES FOR CHOOSING SMALLER PORTIONS AND CHOOSING HIGHER QUALITY CARBOHYDRATES AND LEAN PROTEINS.  USE OF MEASURING CUPS, SMALLER PLATES AND EATING OUT. \par \par .INSTRUCTED TO CALL OFFICE FOR BG < 70, > 250 OR FOR ANY QUESTIONS OR CONCERNS \par WILL TRY AND GET BETTER UNDERSTANDING OF INSURANCE COVERAGE.\par SCHEDULED TO RETURN TO OFFICE ON 1/3/23

## 2022-10-18 NOTE — HISTORY OF PRESENT ILLNESS
[FreeTextEntry1] : HERE TODAY FOR DIABETES FOLLOW UP\par FEELS WELL\par DENIES POLYURIA, POLYDIPSIA OR UNINTENTIONAL WEIGHT LOSS \par A1C > 15.5% 5/29/22\par REPORTS COMPLIANCE W/ LANTUS 20 UNITS QHS AND HUMALOG 8 UNITS BEFORE BREAKFAST AND DINNER - DOES NOT TAKE FOR LUNCH BECAUSE HE DOESN'T WANT TO BRING TO WORK.  TAKES 3 DOSES/DAILY WHEN NOT WORKING\par HAS NOT BEEN TAKING ANY METFORMIN - STATES "AS SOON AS I TAKE METFORMIN, I GET SEVERE HEAD PAIN RIGHT HERE"  POINTING TO B/L TEMPORAL AREA.  STATES THAT WHEN HE DOESN'T TAKE METFORMIN, HE DOES NOT GET THE PAIN.  UNCLEAR HOW MANY TIMES HE TOOK METFORMIN, BUT PT STRONGLY FEELS IT ONLY HAPPENED WHEN TAKING METFORMIN\par ALSO NOT TAKING LISINOPRIL OR ATORVASTATIN - FEELS THAT THE THOSE PILLS MAKE HIM FEEL "WEAK" AND "TIRED"\par NO READINGS < 70 OR SIGNS AND SYMPTOMS OF HYPOGLYCEMIA \par HAS BEEN ALCOHOL-FREE SINCE JUNE AND FEELS "GREAT!"\par BEING MORE MINDFUL OF CARBS - BUT WILL STILL HAVE BAGEL AND CREAM CHEESE FOR BREAKFAST\par TESTS BG 1-3 x WEEK.  READINGS 104-139\par 14 DAY BG AVERAGE 122 # 6 READINGS\par DOES NOT EXERCISE \par PT HAS A HIGH DEDUCTIBLE PLAN AND UNABLE TO AFFORD GLP1 OR SGLTS\par NEEDS EYE EXAM\par HAS NOT GOTTEN LABS ORDERED BY DR YE AS HE WAS UNSURE IF LABS WERE COVERED BY INSURANCE AND DIDN'T WANT TO INCUR A LARGE BILL

## 2022-11-22 ENCOUNTER — APPOINTMENT (OUTPATIENT)
Dept: FAMILY MEDICINE | Facility: CLINIC | Age: 47
End: 2022-11-22

## 2023-01-03 ENCOUNTER — APPOINTMENT (OUTPATIENT)
Dept: FAMILY MEDICINE | Facility: CLINIC | Age: 48
End: 2023-01-03
Payer: COMMERCIAL

## 2023-01-03 DIAGNOSIS — E11.9 TYPE 2 DIABETES MELLITUS W/OUT COMPLICATIONS: ICD-10-CM

## 2023-01-03 DIAGNOSIS — Z87.891 PERSONAL HISTORY OF NICOTINE DEPENDENCE: ICD-10-CM

## 2023-01-03 DIAGNOSIS — Z78.9 OTHER SPECIFIED HEALTH STATUS: ICD-10-CM

## 2023-01-03 DIAGNOSIS — Z79.4 TYPE 2 DIABETES MELLITUS W/OUT COMPLICATIONS: ICD-10-CM

## 2023-01-03 DIAGNOSIS — Z91.14 PATIENT'S OTHER NONCOMPLIANCE WITH MEDICATION REGIMEN: ICD-10-CM

## 2023-01-03 PROCEDURE — 83036 HEMOGLOBIN GLYCOSYLATED A1C: CPT | Mod: QW

## 2023-01-03 PROCEDURE — 99215 OFFICE O/P EST HI 40 MIN: CPT | Mod: 25

## 2023-01-03 RX ORDER — INSULIN LISPRO 100 [IU]/ML
100 INJECTION, SOLUTION INTRAVENOUS; SUBCUTANEOUS
Qty: 1 | Refills: 5 | Status: ACTIVE | COMMUNITY
Start: 2022-07-05 | End: 1900-01-01

## 2023-01-03 RX ORDER — ATORVASTATIN CALCIUM 20 MG/1
20 TABLET, FILM COATED ORAL
Qty: 30 | Refills: 3 | Status: COMPLETED | COMMUNITY
Start: 2022-07-05 | End: 2023-01-03

## 2023-01-03 RX ORDER — PEN NEEDLE, DIABETIC 32GX 5/32"
32G X 4 MM NEEDLE, DISPOSABLE MISCELLANEOUS
Qty: 2 | Refills: 5 | Status: ACTIVE | COMMUNITY
Start: 2022-08-08 | End: 1900-01-01

## 2023-01-03 RX ORDER — METFORMIN HYDROCHLORIDE 500 MG/1
500 TABLET, COATED ORAL
Qty: 60 | Refills: 3 | Status: COMPLETED | COMMUNITY
Start: 2022-07-05 | End: 2023-01-03

## 2023-01-04 PROBLEM — Z91.14 NONCOMPLIANCE WITH MEDICATIONS: Status: ACTIVE | Noted: 2023-01-04

## 2023-01-04 NOTE — ASSESSMENT
[Diabetes Foot Care] : diabetes foot care [Long Term Vascular Complications] : long term vascular complications of diabetes [Carbohydrate Consistent Diet] : carbohydrate consistent diet [Importance of Diet and Exercise] : importance of diet and exercise to improve glycemic control, achieve weight loss and improve cardiovascular health [Action and use of Insulin] : action and use of short and long-acting insulin [Injection Technique, Storage, Sharps Disposal] : injection technique, storage, and sharps disposal [Retinopathy Screening] : Patient was referred to ophthalmology for retinopathy screening [Weight Loss] : weight loss [FreeTextEntry1] : A1C TODAY 5.9% DOWN FROM 7.2%\par DISCUSSED GENERAL A1C GOAL OF 7% AND BG GOALS  BEFORE MEALS AND < 180 2 HOURS AFTER MEALS TO HELP REDUCE INCIDENCE OF COMPLICATIONS \par DECREASE BASAGLAR FROM 20 TO 12 UNITS\par DECREASE HUMALOG FROM 8 TO 4 UNITS PRE-MEAL.\par IMPORTANCE AND MECHANISM OF ACTION FOR METFORMIN, ATORVASTATIN AND LISINOPRIL EXPLAINED IN DETAIL.  PT CONTINUES TO REFUSE TO TAKE.  EXPLAINED THAT IF HE TAKES METFORMIN, HE WILL LIKELY DECREASE INSULIN DOSES AND POSSIBLY DISCONTINUE INSULIN.  PT STATES HE'S MORE COMFORTABLE AND FEELS THAT INSULIN IS BEST FOR HIM.\par EXPLAINED IMPORTANCE OF OBTAINING LABS AND ANNUAL EYE EXAM.  STATES UNABLE TO DO IF HE HAS TO PAY OUT OF POCKET.   PT HOPING  INSURANCE COVERAGE CHANGES TO PROVIDE IMPROVED COVERAGE\par ENCOURAGED SLOW, STEADY WEIGHT LOSS OF 1-2 LBS EACH WEEK\par ENCOURAGED PHYSICAL ACTIVITY 30 MINUTES 5 DAYS EACH WEEK AS TOLERATED \par LENGTHY DISCUSSION ON TIPS AND TECHNIQUES FOR CHOOSING SMALLER PORTIONS AND CHOOSING HIGHER QUALITY CARBOHYDRATES AND LEAN PROTEINS.  USE OF MEASURING CUPS, SMALLER PLATES AND EATING OUT. \par WRITTEN INSTRUCTIONS GIVEN AND REVIEWED \par .INSTRUCTED TO CALL OFFICE FOR BG < 70, > 250 OR FOR ANY QUESTIONS OR CONCERNS\par AGREED TO SCHEDULE APPT IN 3 MONTHS \par

## 2023-01-04 NOTE — HISTORY OF PRESENT ILLNESS
[FreeTextEntry1] : HERE TODAY FOR DIABETES FOLLOW UP\par FEELS WELL\par DENIES POLYURIA, POLYDIPSIA OR UNINTENTIONAL WEIGHT LOSS \par A1C 7.3% 10/17/22\par HAS HIGH DEDUCTIBLE, LIMITED COVERAGE HEALTH INSURANCE (WHEN $$ CAP IS REACHED, EVERYTHING ELSE IS OUT OF POCKET)\par CONTINUES NOT TO TAKE METFORMIN, LISINOPRIL AND ATORVASTATIN - STATES ALL PILLS CAUSE "TERRIBLE PAIN IN HIS TEMPLES"  REPORTS NO PAIN WHEN HE IS NOT TAKING PILLS\par USING [employer] CARES COUPONS FOR INSULIN DUE TO COST\par REPORTS COMPLIANCE W/ BASAGLAR 20  UNITS DAILY  AND HUMALOG 8 UNITS PRE-MEAL.  OFTEN DOES NOT TAKE WITH LUNCH SINCE HE DOESN'T LIKE TO TAKE INSULIN WITH HIM TO WORK\par NEEDS EYE EXAM\par NEEDS LABS - HAS NOT OBTAINED DUE TO COST.  PT HOPING INSURANCE POLICY WILL CHANGE THIS YEAR TO HAVE BETTER COVERAGE.\par DOES NOT EXERCISE BUT HAS PHYSICALLY ACTIVE \par CONTINUES TO ABSTAIN FROM ALCOHOL\par RARELY TESTS BG.  REPORTS READINGS 110-140\par NO READINGS < 70 OR SIGNS AND SYMPTOMS OF HYPOGLYCEMIA

## 2023-01-04 NOTE — REVIEW OF SYSTEMS
[Fatigue] : no fatigue [Blurred Vision] : no blurred vision [Dysphagia] : no dysphagia [Chest Pain] : no chest pain [Palpitations] : no palpitations [Shortness Of Breath] : no shortness of breath [Nausea] : no nausea [Diarrhea] : no diarrhea [Gas/Bloating] : no gas/bloating [Polyuria] : no polyuria [Joint Pain] : no joint pain [Headaches] : no headaches [Depression] : no depression [Polydipsia] : no polydipsia

## 2023-06-19 RX ORDER — INSULIN GLARGINE 100 [IU]/ML
100 INJECTION, SOLUTION SUBCUTANEOUS
Qty: 1 | Refills: 5 | Status: ACTIVE | COMMUNITY
Start: 2022-08-08 | End: 1900-01-01

## 2023-06-19 RX ORDER — BLOOD SUGAR DIAGNOSTIC
STRIP MISCELLANEOUS
Qty: 2 | Refills: 4 | Status: ACTIVE | COMMUNITY
Start: 2022-07-05 | End: 1900-01-01

## 2023-08-01 ENCOUNTER — RESULT CHARGE (OUTPATIENT)
Age: 48
End: 2023-08-01

## 2023-08-01 ENCOUNTER — APPOINTMENT (OUTPATIENT)
Dept: FAMILY MEDICINE | Facility: HOSPITAL | Age: 48
End: 2023-08-01

## 2023-08-01 NOTE — PHYSICAL EXAM
[Alert] : alert [No Acute Distress] : no acute distress [No Respiratory Distress] : no respiratory distress [Normal Gait] : normal gait [Right foot was examined, including] : right foot ~C was examined, including visual inspection with sensory and pulse exams [Left foot was examined, including] : left foot ~C was examined, including visual inspection with sensory and pulse exams [Normal] : normal [2+] : 2+ in the dorsalis pedis [Oriented x3] : oriented to person, place, and time [Normal Affect] : the affect was normal [Recent Memory Normal] : recent memory was not impaired [Normal Insight/Judgement] : insight and judgment were intact [Normal Mood] : the mood was normal [Remote Memory Normal] : remote memory was not impaired [Foot Ulcers] : no foot ulcers

## 2023-08-01 NOTE — ASSESSMENT
[Diabetes Foot Care] : diabetes foot care [Long Term Vascular Complications] : long term vascular complications of diabetes [Carbohydrate Consistent Diet] : carbohydrate consistent diet [Importance of Diet and Exercise] : importance of diet and exercise to improve glycemic control, achieve weight loss and improve cardiovascular health [Exercise/Effect on Glucose] : exercise/effect on glucose [Hypoglycemia Management] : hypoglycemia management [Action and use of Insulin] : action and use of short and long-acting insulin [Self Monitoring of Blood Glucose] : self monitoring of blood glucose [Insulin Self-Administration] : insulin self-administration [Injection Technique, Storage, Sharps Disposal] : injection technique, storage, and sharps disposal [Retinopathy Screening] : Patient was referred to ophthalmology for retinopathy screening [Weight Loss] : weight loss [FreeTextEntry1] : A1C TODAY 6.2% INSTRUCTED TO STOP INSULIN  EDUCATED ON BENEFITS OF METFORMIN.  PT DECLINED LENGTHY DISCUSSION ON TIPS AND TECHNIQUES FOR CHOOSING SMALLER PORTIONS AND CHOOSING HIGHER QUALITY CARBOHYDRATES AND LEAN PROTEINS.  USE OF MEASURING CUPS, SMALLER PLATES AND EATING OUT. EDUCATED ON PAIRED TESTING OF BG.  ENCOURAGED TO TEST BEFORE A MEAL AND THEN 2 HOURS AFTER THAT MEAL TO ASSESS RESPONSE TO THAT MEAL.  ENCOURAGED TO VARY MEALS TESTED AND RECORD.  .INSTRUCTED TO CALL OFFICE FOR BG < 70, > 250 OR FOR ANY QUESTIONS OR CONCERNS  FOLLOW UP IN 3 MONTHS

## 2023-08-01 NOTE — HISTORY OF PRESENT ILLNESS
[FreeTextEntry1] : HERE TODAY FOR DIABETES FOLLOW UP FEELS WELL DENIES POLYURIA, POLYDIPSIA OR UNINTENTIONAL WEIGHT LOSS A1C 5.9% 1/3/23  DOWN FROM 7.3% 10/17/22 HAS HIGH DEDUCTIBLE, LIMITED COVERAGE HEALTH INSURANCE (WHEN $$ CAP IS REACHED, EVERYTHING ELSE IS OUT OF POCKET)  CONTINUES NOT TO TAKE METFORMIN, LISINOPRIL AND ATORVASTATIN - STATES ALL PILLS CAUSE "TERRIBLE PAIN IN HIS TEMPLES" REPORTS NO PAIN WHEN HE IS NOT TAKING PILLS USING [employer] CARES COUPONS FOR INSULIN DUE TO COST STOPPED TAKING BASAGLAR IN JANUARY - THOUGHT HE DIDN'T NEED  HUMALOG 4 UNITS PRE-MEAL. OFTEN DOES NOT TAKE WITH LUNCH SINCE HE DOESN'T LIKE TO TAKE INSULIN WITH HIM TO WORK  NEEDS EYE EXAM  NEEDS LABS - HAS NOT OBTAINED DUE TO COST. PT HOPING INSURANCE POLICY WILL CHANGE THIS YEAR TO HAVE BETTER COVERAGE. DOES NOT EXERCISE BUT HAS PHYSICALLY ACTIVE JOB  CONTINUES TO ABSTAIN FROM ALCOHOL  RARELY TESTS BG. REPORTS READINGS 110-140  NO READINGS < 70 OR SIGNS AND SYMPTOMS OF HYPOGLYCEMIA

## 2023-08-01 NOTE — REVIEW OF SYSTEMS
[Fatigue] : no fatigue [Blurred Vision] : no blurred vision [Dysphagia] : no dysphagia [Chest Pain] : no chest pain [Palpitations] : no palpitations [Shortness Of Breath] : no shortness of breath [Polyuria] : no polyuria [Joint Pain] : no joint pain [Headaches] : no headaches [Polydipsia] : no polydipsia

## 2023-08-11 LAB — HBA1C MFR BLD HPLC: 6.2

## 2024-09-19 NOTE — ED ADULT TRIAGE NOTE - PRO INTERPRETER NEED 2
Rx Refill Request Telephone Encounter    Name:  Robyn Madera  :  [id number]  Medication Name:      Atorvastatin  40mg oral tablet 1 tab taken once daily at bedtime                  Specific Pharmacy location:  [facility]  Date of last appointment:  06/10/2024  Date of next appointment:  2024  Best number to reach patient:  [phone]             English